# Patient Record
Sex: MALE | Race: BLACK OR AFRICAN AMERICAN | URBAN - METROPOLITAN AREA
[De-identification: names, ages, dates, MRNs, and addresses within clinical notes are randomized per-mention and may not be internally consistent; named-entity substitution may affect disease eponyms.]

---

## 2021-09-27 ENCOUNTER — DOCUMENTATION ONLY (OUTPATIENT)
Dept: TRANSPLANT | Facility: CLINIC | Age: 46
End: 2021-09-27

## 2021-09-27 ENCOUNTER — TELEPHONE (OUTPATIENT)
Dept: TRANSPLANT | Facility: CLINIC | Age: 46
End: 2021-09-27

## 2021-09-27 NOTE — TELEPHONE ENCOUNTER
"vdentity Information  SSN:[id number]  Donor has applied for lost wage reimbursement  Current Status: Surgery Scheduled:[not entered]  Surgery Confirmed?[not answered]  Donor Intake Start:21Donor Intake Complete:21  Expiration Date:21  Gender:MalePreferred Language:English  Full Name:Buddy Ross  Needed:[not answered]  Phone Number:[phone] Phone:[phone]  Contact Preference:[not answered]Best Contact Time:10am - 5pm  Emergency Contact:Kathryn Burgos Contact #:[phone]  Relationship to Contact:Contact is my spouse  :[birth year]Age:46  Country:[place]  Address:[address] [place] [address] AveCity:[place]  State:[place] Code:[id number]  Height:5'9\"Weight:185lbs  BMI:27.3  Employment Status:EmployedHas PTO for donation?No, not reimbursed  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 De Jesus  [place], [place]  Donating for Local Recipient   Recipient's Name:Ramiro Mills :47  Recipient's Status:Patient on dialysis.How DD knows Recip:Other  DD communicates w/ Recip:Several Times Per Week  Indicated possible interest in:  Motivation to donate:  Dialysis has been hard on the recipient.  Living Donor Pre-Screening  Is In [place]  Yes  Will Accept Blood Transfusions?  Yes  Has been Diagnosed with Kidney Disease?  No  Has had a Heart Attack?  No  Has Diabetes?  No  Has had Cancer?  No  Has had Kidney Stones?  No  Has Used Tobacco  Yes    - Currently uses Tobacco?  Yes    - Will Stop for Surgery?  Yes    - How Many Years:29    - Tobacco use (packs/cans) / Frequency:1 / Daily  Has HIV?  No  Is Currently Incarcerated?  No  Is Currently Residing in [place]  Yes  History Misc  Has Allergies?  No  Has had " Surgeries?  Yes  Surgery When  ACL and MCL 2006?  Lower Back 2010?  Takes Medication?  No  Medical History  History of High BP?  Never  Has History Of CABG (bypass surgery)?  No  History of Blood Clots?  Never  History of Coronary Disease?  Never  Has Stents Implanted?  No  Has History of Chest Pain with Exercise?  No  Has History of Chest Pain at Other Times?  No  Results of Climbing 2 Flights of Stairs?No Problem  Has had Stress Test within Last Year?  No  Has had Stroke?  No  Has had Leg Bypass?  No  History of Lung Disease?  Never  History of COPD?  Never  History of TB?  Never    - Is TB Active?[not answered]  History of Pneumonia?  Never  Has Respiratory Issues?  No  Has Gastro Issues?  No  History of Gallstones?  Never  History of Pancreatitis?  Never  History of Liver Disease?  Never  History of Hepatitis B?  Never    - Is Hep B Active?[not answered]  History of Hepatitis C?  Never  History of Bleeding Problem?  Never  History of UTIs?  No  History of Kidney Damage?  Never  History of Proteinuria?  Never  History of Hematuria?  Never  History of Neuro Disease?  Never  History of Seizure?  Never  History of Lupus?  Never  History of Paralysis?  Never  History of Arthritis?  Never  History of Neuropathy?  Never  History of Depression?  Never  History of Anxiety?  Never  History of Documented Psychiatric Illness?  Never  Has had Transfusions?  No  History of Obesity?  No  History of Fabry's Disease?  No  History of Sickle Cell Disease?  No  History of Sickle Cell Trait?  No  History of Sarcoidosis?  No  History of Auto-Immune Disease  No  Has had Physical Exam?  Yes    - how many years ago:2  Has had PSA Test?  No  Has had Colonoscopy?  No  Medical History Comments?[no comments]  Living Donor Family Medical History  Anyone with kidney disease?  Yes    - which family members:Maternal grandfather (Kidney Stones)  Anyone with liver disease?  No  Anyone with heart disease?  Yes    - which family members:Maternal  Grandmother  Anyone with coronary artery disease?  Unknown  Anyone with high blood pressure?  Unknown  Anyone with blood disorder?  Unknown  Anyone with cancer?  Yes    - which family members:Father, Paternal Grandfather  Anyone with kidney cancer?  No  Anyone with diabetes?  No  Is mother alive?  Yes  Mother's age?66  Is father alive?  No  Father's age?56  Father's cause of death?Lung Cancer  How many siblings?5  How many adult children?1  How many children under 18?2  Social History  Has Used Alcohol?  No  Has Used Drugs?  No  Has had legal issues w/ law enforcement?  No  Traveled over 100 miles from home in last year?  No  Has had suicidal thoughts or attempts in the last five years?  No

## 2021-09-28 ENCOUNTER — TELEPHONE (OUTPATIENT)
Dept: TRANSPLANT | Facility: CLINIC | Age: 46
End: 2021-09-28

## 2021-09-28 NOTE — TELEPHONE ENCOUNTER
RENETTA initial call attempted today.  No answer.  I left a voice message asking him to call the transplant center if he remains interested in pursuing living donation.

## 2021-09-28 NOTE — TELEPHONE ENCOUNTER
Initial Independent Living Donor Advocate contact made with potential donor today.  I introduced myself and my role during the donation process, includin.  RENETTA ROLE   The federal government requires that all licensed transplant centers provide the living donor with an Independent Living Donor Advocate (RENETTA).  I do not meet recipients or attend meetings that discuss their care or decision to transplant them. My role is separate to avoid any conflict of interest.  My role is to ensure:  1) your rights are protected;  2) you get all the information you need from the transplant team to make a fully informed decision whether to donate;   3) that living donation is in your best interest.   4) that you have the right to decide NOT to go forward with living donation at any time during this process.  I am available to you throughout the workup, during surgery phase and follow-up at home.   2. WORKUP & PRIVACY     Your identity and workup are not shared with the recipient at any time.     There is a medical donor workup that consists of testing to determine if you are healthy enough to donate.  Workup tests include many blood draws, urine collection/ (kidney function testing), chest x-ray, EKG, CT scan. As you complete each step then you may move on to the next.  Workup can take as little or as long as you need and you can stop the process at any time.     Transplant is a treatment option, not a cure. A kidney from a living kidney donor can last 12-14 years.  Other treatment options are  donation and two types of dialysis.     This is major surgery and your estimated hospital stay is approximately 1-2 nights.  After surgery, there are driving and lifting restrictions - no driving for two weeks and no lifting over ten pounds for 6 - 8 weeks.  Donors are routinely off from work for 4 - 6 weeks after surgery, and potentially longer if they have a physical job.       If you anticipate lost wages due to donation,  donor wage reimbursement options may be available to you and will be reviewed with you during the evaluation process.      The recipient's insurance covers the medical expenses related to the donor evaluation and surgery.  However, it is important for you to carry your own health insurance to address any medical issues that are found and are NOT related to living donation.  3.  QUESTIONS  Have you received a packet from the transplant department?  yes  Questions?    Have you discussed with anyone your potential decision to donate?   Yes, with wife and mother  Is anyone pressuring or coercing you to donate?no  Have you discussed any financial arrangements with recipient around donating a kidney?no  Are you aware that you can confidentially opt out at any time, up to and including day of donation? yes  At this time, would you like to proceed with the medical evaluation to see if you can be a kidney donor?  yes    If yes, the donor coordinator will be reaching out to you with next steps.     You can reach me or someone else on the RENETTA team by calling [phone] Option 3.    RENETTA NOTES: I was able to connect with Mr. Marshall today.  He is a highly motivated donor.  Some concerns since many family members have had kidney stones  ( he has not).  He will talk more with his coordinator, Lanie, when she connects with him at 11 AM on 10/12/2021.    Duration of call 30 minutes

## 2021-10-12 ENCOUNTER — TELEPHONE (OUTPATIENT)
Dept: TRANSPLANT | Facility: CLINIC | Age: 46
End: 2021-10-12

## 2021-10-12 NOTE — TELEPHONE ENCOUNTER
Contacted Buddy Marshall to introduce myself and my role, review of medical/surgical/family history and next steps.     Buddy Marshall  is aware He can stop donor evaluation at any time.    Have you ever been positive for COVID 19? no    Have you received the COVID vaccination? yes If yes, when and what brand? Pfizer, completed 5/2021     Buddy Marshall is a 46 year old male that would like to learn more about being a donor for his step dad Antonio Vyas. Buddy is open to paired exchange.      Concerns from medical/surgical/family history: smokes- motivated to quit, planning to reach out to his PCP for resources/support, occasional marijuana use- reports he could discontinue use if needed    Reviewed any history of travel in endemic areas: [place] only  Strongyloides- [place], [place] and [place].  Trypanosoma cruzi ([place])- [place]  West Nile Virus- [place], [place], [place], [place] and [place].     Per our Phase 1 algorithm, does not meet criteria to do preliminary testing.    Reviewed evaluation testing: Covid PCR, Iohexol, Lab work, CXR, EKG, Provider visits and functions, CT Angiogram.     Reviewed operations of selection committee and angio review meetings and the need for multidisciplinary input.     Briefly went over options if approved.     Buddy would like to proceed to evaluation.     Confirmed that he reviewed Informed consent document and all questions answered.  Reviewed that they will receive Docusign to obtain electronic signature for the following: Informed consent, SRTR data, ALFRED for medical information, Auth for Electronic communication and will need their signed consent back before proceeding with evaluation.      Encouraged sign up for MyChart and confirmed [facility] sign up.    Verified recipient status.     Will send orders to scheduling team to set up for evaluation testing.

## 2021-10-13 DIAGNOSIS — Z00.5 TRANSPLANT DONOR EVALUATION: Primary | ICD-10-CM

## 2021-10-13 RX ORDER — METHYLPREDNISOLONE SODIUM SUCCINATE 125 MG/2ML
125 INJECTION, POWDER, LYOPHILIZED, FOR SOLUTION INTRAMUSCULAR; INTRAVENOUS
Status: CANCELLED
Start: 2021-10-28

## 2021-10-13 RX ORDER — NALOXONE HYDROCHLORIDE 0.4 MG/ML
0.2 INJECTION, SOLUTION INTRAMUSCULAR; INTRAVENOUS; SUBCUTANEOUS
Status: CANCELLED | OUTPATIENT
Start: 2021-10-28

## 2021-10-13 RX ORDER — MEPERIDINE HYDROCHLORIDE 25 MG/ML
25 INJECTION INTRAMUSCULAR; INTRAVENOUS; SUBCUTANEOUS EVERY 30 MIN PRN
Status: CANCELLED | OUTPATIENT
Start: 2021-10-28

## 2021-10-13 RX ORDER — ALBUTEROL SULFATE 90 UG/1
1-2 AEROSOL, METERED RESPIRATORY (INHALATION)
Status: CANCELLED
Start: 2021-10-28

## 2021-10-13 RX ORDER — DIPHENHYDRAMINE HYDROCHLORIDE 50 MG/ML
50 INJECTION INTRAMUSCULAR; INTRAVENOUS
Status: CANCELLED
Start: 2021-10-28

## 2021-10-13 RX ORDER — EPINEPHRINE 1 MG/ML
0.3 INJECTION, SOLUTION, CONCENTRATE INTRAVENOUS EVERY 5 MIN PRN
Status: CANCELLED | OUTPATIENT
Start: 2021-10-28

## 2021-10-13 RX ORDER — ALBUTEROL SULFATE 0.83 MG/ML
2.5 SOLUTION RESPIRATORY (INHALATION)
Status: CANCELLED | OUTPATIENT
Start: 2021-10-28

## 2021-10-18 ENCOUNTER — TELEPHONE (OUTPATIENT)
Dept: TRANSPLANT | Facility: CLINIC | Age: 46
End: 2021-10-18

## 2021-10-18 NOTE — TELEPHONE ENCOUNTER
Buddy is supposed to do labs and COVID test at [facility] lab on 10/25/21.This is for his eval. Right now it looks like everything is scheduled on 10/28.So eval labs and COVID test on Mon 10/25 in [facility] lab--thanks!!

## 2021-10-24 ENCOUNTER — HEALTH MAINTENANCE LETTER (OUTPATIENT)
Age: 46
End: 2021-10-24

## 2021-10-25 ENCOUNTER — LAB (OUTPATIENT)
Dept: LAB | Facility: CLINIC | Age: 46
End: 2021-10-25
Attending: SURGERY

## 2021-10-25 DIAGNOSIS — Z00.5 TRANSPLANT DONOR EVALUATION: ICD-10-CM

## 2021-10-25 LAB
ABO/RH(D): NORMAL
ABO/RH(D): NORMAL
ALBUMIN SERPL-MCNC: 3.7 G/DL (ref 3.4–5)
ALBUMIN UR-MCNC: NEGATIVE MG/DL
ALP SERPL-CCNC: 57 U/L (ref 40–150)
ALT SERPL W P-5'-P-CCNC: 32 U/L (ref 0–70)
ANION GAP SERPL CALCULATED.3IONS-SCNC: 3 MMOL/L (ref 3–14)
ANTIBODY SCREEN: NEGATIVE
APPEARANCE UR: CLEAR
APTT PPP: 30 SECONDS (ref 22–38)
AST SERPL W P-5'-P-CCNC: 18 U/L (ref 0–45)
BILIRUB SERPL-MCNC: 1 MG/DL (ref 0.2–1.3)
BILIRUB UR QL STRIP: NEGATIVE
BUN SERPL-MCNC: 10 MG/DL (ref 7–30)
CALCIUM SERPL-MCNC: 8.9 MG/DL (ref 8.5–10.1)
CHLORIDE BLD-SCNC: 105 MMOL/L (ref 94–109)
CHOLEST SERPL-MCNC: 224 MG/DL
CMV IGG SERPL IA-ACNC: >10 U/ML
CMV IGG SERPL IA-ACNC: ABNORMAL
CO2 SERPL-SCNC: 28 MMOL/L (ref 20–32)
COLOR UR AUTO: YELLOW
CREAT SERPL-MCNC: 0.95 MG/DL (ref 0.66–1.25)
CREAT UR-MCNC: 165 MG/DL
CREAT UR-MCNC: 165 MG/DL
EBV VCA IGG SER IA-ACNC: >750 U/ML
EBV VCA IGG SER IA-ACNC: POSITIVE
EBV VCA IGM SER IA-ACNC: <10 U/ML
EBV VCA IGM SER IA-ACNC: NORMAL
ERYTHROCYTE [DISTWIDTH] IN BLOOD BY AUTOMATED COUNT: 12.4 % (ref 10–15)
FASTING STATUS PATIENT QL REPORTED: YES
GFR SERPL CREATININE-BSD FRML MDRD: >90 ML/MIN/1.73M2
GLUCOSE BLD-MCNC: 93 MG/DL (ref 70–99)
GLUCOSE UR STRIP-MCNC: NEGATIVE MG/DL
HBA1C MFR BLD: 5 % (ref 0–5.6)
HBV CORE AB SERPL QL IA: NONREACTIVE
HCT VFR BLD AUTO: 45.9 % (ref 40–53)
HCV AB SERPL QL IA: NONREACTIVE
HDLC SERPL-MCNC: 50 MG/DL
HGB BLD-MCNC: 15.7 G/DL (ref 13.3–17.7)
HGB UR QL STRIP: NEGATIVE
INR PPP: 1.06 (ref 0.85–1.15)
KETONES UR STRIP-MCNC: NEGATIVE MG/DL
LDLC SERPL CALC-MCNC: 152 MG/DL
LEUKOCYTE ESTERASE UR QL STRIP: NEGATIVE
MCH RBC QN AUTO: 30.6 PG (ref 26.5–33)
MCHC RBC AUTO-ENTMCNC: 34.2 G/DL (ref 31.5–36.5)
MCV RBC AUTO: 90 FL (ref 78–100)
MICROALBUMIN UR-MCNC: 6 MG/L
MICROALBUMIN/CREAT UR: 3.64 MG/G CR (ref 0–17)
MUCOUS THREADS #/AREA URNS LPF: PRESENT /LPF
NITRATE UR QL: NEGATIVE
NONHDLC SERPL-MCNC: 174 MG/DL
PH UR STRIP: 5 [PH] (ref 5–7)
PHOSPHATE SERPL-MCNC: 2.8 MG/DL (ref 2.5–4.5)
PLATELET # BLD AUTO: 269 10E3/UL (ref 150–450)
POTASSIUM BLD-SCNC: 3.6 MMOL/L (ref 3.4–5.3)
PROT SERPL-MCNC: 7 G/DL (ref 6.8–8.8)
PROT UR-MCNC: 0.08 G/L
PROT/CREAT 24H UR: 0.05 G/G CR (ref 0–0.2)
RBC # BLD AUTO: 5.13 10E6/UL (ref 4.4–5.9)
RBC URINE: 1 /HPF
SARS-COV-2 RNA RESP QL NAA+PROBE: NEGATIVE
SODIUM SERPL-SCNC: 136 MMOL/L (ref 133–144)
SP GR UR STRIP: 1.01 (ref 1–1.03)
SPECIMEN EXPIRATION DATE: NORMAL
T PALLIDUM AB SER QL: NONREACTIVE
TRIGL SERPL-MCNC: 109 MG/DL
URATE SERPL-MCNC: 4.7 MG/DL (ref 3.5–7.2)
UROBILINOGEN UR STRIP-MCNC: NORMAL MG/DL
WBC # BLD AUTO: 8.4 10E3/UL (ref 4–11)
WBC URINE: 1 /HPF

## 2021-10-25 PROCEDURE — 86900 BLOOD TYPING SEROLOGIC ABO: CPT | Mod: 90 | Performed by: PATHOLOGY

## 2021-10-25 PROCEDURE — 80053 COMPREHEN METABOLIC PANEL: CPT | Performed by: PATHOLOGY

## 2021-10-25 PROCEDURE — 86481 TB AG RESPONSE T-CELL SUSP: CPT | Mod: 90 | Performed by: PATHOLOGY

## 2021-10-25 PROCEDURE — 99000 SPECIMEN HANDLING OFFICE-LAB: CPT | Performed by: PATHOLOGY

## 2021-10-25 PROCEDURE — 84100 ASSAY OF PHOSPHORUS: CPT | Performed by: PATHOLOGY

## 2021-10-25 PROCEDURE — 84156 ASSAY OF PROTEIN URINE: CPT | Performed by: PATHOLOGY

## 2021-10-25 PROCEDURE — 81001 URINALYSIS AUTO W/SCOPE: CPT | Performed by: PATHOLOGY

## 2021-10-25 PROCEDURE — U0005 INFEC AGEN DETEC AMPLI PROBE: HCPCS | Mod: 90 | Performed by: PATHOLOGY

## 2021-10-25 PROCEDURE — 86780 TREPONEMA PALLIDUM: CPT | Mod: 90 | Performed by: PATHOLOGY

## 2021-10-25 PROCEDURE — 82043 UR ALBUMIN QUANTITATIVE: CPT | Performed by: PATHOLOGY

## 2021-10-25 PROCEDURE — 85730 THROMBOPLASTIN TIME PARTIAL: CPT | Performed by: PATHOLOGY

## 2021-10-25 PROCEDURE — 86706 HEP B SURFACE ANTIBODY: CPT | Mod: 90 | Performed by: PATHOLOGY

## 2021-10-25 PROCEDURE — 86788 WEST NILE VIRUS AB IGM: CPT | Mod: 90 | Performed by: PATHOLOGY

## 2021-10-25 PROCEDURE — U0003 INFECTIOUS AGENT DETECTION BY NUCLEIC ACID (DNA OR RNA); SEVERE ACUTE RESPIRATORY SYNDROME CORONAVIRUS 2 (SARS-COV-2) (CORONAVIRUS DISEASE [COVID-19]), AMPLIFIED PROBE TECHNIQUE, MAKING USE OF HIGH THROUGHPUT TECHNOLOGIES AS DESCRIBED BY CMS-2020-01-R: HCPCS | Mod: 90 | Performed by: PATHOLOGY

## 2021-10-25 PROCEDURE — 86803 HEPATITIS C AB TEST: CPT | Mod: 90 | Performed by: PATHOLOGY

## 2021-10-25 PROCEDURE — 84550 ASSAY OF BLOOD/URIC ACID: CPT | Performed by: PATHOLOGY

## 2021-10-25 PROCEDURE — 86704 HEP B CORE ANTIBODY TOTAL: CPT | Mod: 90 | Performed by: PATHOLOGY

## 2021-10-25 PROCEDURE — 87340 HEPATITIS B SURFACE AG IA: CPT | Mod: 90 | Performed by: PATHOLOGY

## 2021-10-25 PROCEDURE — 36415 COLL VENOUS BLD VENIPUNCTURE: CPT | Performed by: PATHOLOGY

## 2021-10-25 PROCEDURE — 86665 EPSTEIN-BARR CAPSID VCA: CPT | Mod: 90 | Performed by: PATHOLOGY

## 2021-10-25 PROCEDURE — 86644 CMV ANTIBODY: CPT | Mod: 90 | Performed by: PATHOLOGY

## 2021-10-25 PROCEDURE — 85027 COMPLETE CBC AUTOMATED: CPT | Performed by: PATHOLOGY

## 2021-10-25 PROCEDURE — 86789 WEST NILE VIRUS ANTIBODY: CPT | Mod: 90 | Performed by: PATHOLOGY

## 2021-10-25 PROCEDURE — 83036 HEMOGLOBIN GLYCOSYLATED A1C: CPT | Performed by: PATHOLOGY

## 2021-10-25 PROCEDURE — 86901 BLOOD TYPING SEROLOGIC RH(D): CPT | Mod: 90 | Performed by: PATHOLOGY

## 2021-10-25 PROCEDURE — 80061 LIPID PANEL: CPT | Performed by: PATHOLOGY

## 2021-10-25 PROCEDURE — 86850 RBC ANTIBODY SCREEN: CPT | Mod: 90 | Performed by: PATHOLOGY

## 2021-10-25 PROCEDURE — 85610 PROTHROMBIN TIME: CPT | Performed by: PATHOLOGY

## 2021-10-26 LAB
GAMMA INTERFERON BACKGROUND BLD IA-ACNC: 0.04 IU/ML
HBV SURFACE AB SERPL IA-ACNC: 0.56 M[IU]/ML
HBV SURFACE AG SERPL QL IA: NONREACTIVE
HIV 1+2 AB+HIV1 P24 AG SERPL QL IA: NONREACTIVE
M TB IFN-G BLD-IMP: NEGATIVE
M TB IFN-G CD4+ BCKGRND COR BLD-ACNC: 9.96 IU/ML
MITOGEN IGNF BCKGRD COR BLD-ACNC: 0.03 IU/ML
MITOGEN IGNF BCKGRD COR BLD-ACNC: 0.07 IU/ML
QUANTIFERON MITOGEN: 10 IU/ML
QUANTIFERON NIL TUBE: 0.04 IU/ML
QUANTIFERON TB1 TUBE: 0.07 IU/ML
QUANTIFERON TB2 TUBE: 0.11
WNV IGG SER IA-ACNC: 0.08 IV
WNV IGM SER IA-ACNC: 0.01 IV

## 2021-10-28 ENCOUNTER — APPOINTMENT (OUTPATIENT)
Dept: TRANSPLANT | Facility: CLINIC | Age: 46
End: 2021-10-28
Attending: TRANSPLANT SURGERY

## 2021-10-28 ENCOUNTER — LAB (OUTPATIENT)
Dept: LAB | Facility: CLINIC | Age: 46
End: 2021-10-28
Attending: INTERNAL MEDICINE

## 2021-10-28 ENCOUNTER — OFFICE VISIT (OUTPATIENT)
Dept: TRANSPLANT | Facility: CLINIC | Age: 46
End: 2021-10-28
Attending: TRANSPLANT SURGERY

## 2021-10-28 ENCOUNTER — OFFICE VISIT (OUTPATIENT)
Dept: NEPHROLOGY | Facility: CLINIC | Age: 46
End: 2021-10-28
Attending: TRANSPLANT SURGERY

## 2021-10-28 ENCOUNTER — OFFICE VISIT (OUTPATIENT)
Dept: INFUSION THERAPY | Facility: CLINIC | Age: 46
End: 2021-10-28
Attending: INTERNAL MEDICINE

## 2021-10-28 ENCOUNTER — ANCILLARY PROCEDURE (OUTPATIENT)
Dept: CT IMAGING | Facility: CLINIC | Age: 46
End: 2021-10-28
Attending: INTERNAL MEDICINE

## 2021-10-28 ENCOUNTER — ANCILLARY PROCEDURE (OUTPATIENT)
Dept: GENERAL RADIOLOGY | Facility: CLINIC | Age: 46
End: 2021-10-28
Attending: INTERNAL MEDICINE

## 2021-10-28 ENCOUNTER — ALLIED HEALTH/NURSE VISIT (OUTPATIENT)
Dept: TRANSPLANT | Facility: CLINIC | Age: 46
End: 2021-10-28
Attending: TRANSPLANT SURGERY

## 2021-10-28 VITALS
HEART RATE: 45 BPM | SYSTOLIC BLOOD PRESSURE: 128 MMHG | HEIGHT: 69 IN | BODY MASS INDEX: 26.81 KG/M2 | DIASTOLIC BLOOD PRESSURE: 83 MMHG | WEIGHT: 181 LBS | OXYGEN SATURATION: 98 %

## 2021-10-28 DIAGNOSIS — Z00.5 TRANSPLANT DONOR EVALUATION: ICD-10-CM

## 2021-10-28 DIAGNOSIS — Z00.5 TRANSPLANT DONOR EVALUATION: Primary | ICD-10-CM

## 2021-10-28 PROCEDURE — 36415 COLL VENOUS BLD VENIPUNCTURE: CPT

## 2021-10-28 PROCEDURE — 36415 COLL VENOUS BLD VENIPUNCTURE: CPT | Performed by: TRANSPLANT SURGERY

## 2021-10-28 PROCEDURE — 81378 HLA I & II TYPING HR: CPT

## 2021-10-28 PROCEDURE — 74175 CTA ABDOMEN W/CONTRAST: CPT | Performed by: RADIOLOGY

## 2021-10-28 PROCEDURE — 82542 COL CHROMOTOGRAPHY QUAL/QUAN: CPT | Performed by: TRANSPLANT SURGERY

## 2021-10-28 PROCEDURE — 99203 OFFICE O/P NEW LOW 30 MIN: CPT | Performed by: TRANSPLANT SURGERY

## 2021-10-28 PROCEDURE — 99245 OFF/OP CONSLTJ NEW/EST HI 55: CPT

## 2021-10-28 PROCEDURE — 71046 X-RAY EXAM CHEST 2 VIEWS: CPT | Mod: GC | Performed by: RADIOLOGY

## 2021-10-28 PROCEDURE — 250N000011 HC RX IP 250 OP 636: Performed by: INTERNAL MEDICINE

## 2021-10-28 RX ORDER — EPINEPHRINE 1 MG/ML
0.3 INJECTION, SOLUTION INTRAMUSCULAR; SUBCUTANEOUS EVERY 5 MIN PRN
Status: CANCELLED | OUTPATIENT
Start: 2021-10-28

## 2021-10-28 RX ORDER — NALOXONE HYDROCHLORIDE 0.4 MG/ML
0.2 INJECTION, SOLUTION INTRAMUSCULAR; INTRAVENOUS; SUBCUTANEOUS
Status: CANCELLED | OUTPATIENT
Start: 2021-10-28

## 2021-10-28 RX ORDER — ALBUTEROL SULFATE 90 UG/1
1-2 AEROSOL, METERED RESPIRATORY (INHALATION)
Status: CANCELLED
Start: 2021-10-28

## 2021-10-28 RX ORDER — MEPERIDINE HYDROCHLORIDE 25 MG/ML
25 INJECTION INTRAMUSCULAR; INTRAVENOUS; SUBCUTANEOUS EVERY 30 MIN PRN
Status: CANCELLED | OUTPATIENT
Start: 2021-10-28

## 2021-10-28 RX ORDER — METHYLPREDNISOLONE SODIUM SUCCINATE 125 MG/2ML
125 INJECTION, POWDER, LYOPHILIZED, FOR SOLUTION INTRAMUSCULAR; INTRAVENOUS
Status: CANCELLED
Start: 2021-10-28

## 2021-10-28 RX ORDER — ALBUTEROL SULFATE 0.83 MG/ML
2.5 SOLUTION RESPIRATORY (INHALATION)
Status: CANCELLED | OUTPATIENT
Start: 2021-10-28

## 2021-10-28 RX ORDER — IOPAMIDOL 755 MG/ML
100 INJECTION, SOLUTION INTRAVASCULAR ONCE
Status: COMPLETED | OUTPATIENT
Start: 2021-10-28 | End: 2021-10-28

## 2021-10-28 RX ORDER — DIPHENHYDRAMINE HYDROCHLORIDE 50 MG/ML
50 INJECTION INTRAMUSCULAR; INTRAVENOUS
Status: CANCELLED
Start: 2021-10-28

## 2021-10-28 RX ADMIN — IOPAMIDOL 100 ML: 755 INJECTION, SOLUTION INTRAVASCULAR at 12:36

## 2021-10-28 RX ADMIN — IOHEXOL 5 ML: 300 INJECTION, SOLUTION INTRAVENOUS at 07:10

## 2021-10-28 ASSESSMENT — MIFFLIN-ST. JEOR: SCORE: 1686

## 2021-10-28 NOTE — PROGRESS NOTES
Donor Iohexol test    Buddy Marshall presents today to [facility] for a Donor Iohexol test.      Progress note:  ID verified by name and .     The following information was verified with the patient:  Female Patients is there any possibility of being pregnant n/a  Is there a history of allergy (skin rash, swelling, ect) to:   A.  Iodine (except skin reactions to betadine): No   B. Intravenous radio-contrast agents: No   C. Seafood No     present during visit today: Not Applicable.    R.N. provided patient with educational handout regarding timed test. Yes    24 g piv placed in right ac and Iohexol administered over 2 minutes.  Positive blood return verified before and after injection. piv removed.  20 gauge PIV placed in left  for blood draws and CT this afternoon.    Medication administered:  Iohexol (Omnipaque 300mg iodine/ml concentration) 5 mls.    Start time: 710  Stop time: 712    Drug Waste Record    Drug Name: iohexol  Dose: 5ml  Route administered: IV  NDC #: 0407-1413-10  Amount of waste(mL):5ml  Reason for waste: Single use vial    Administrations This Visit     iohexol (OMNIPAQUE 300) injection 5 mL     Admin Date  10/28/2021 Action  Given Dose  5 mL Route  Intravenous Administered By  Marilynn Guzman RN                    Evaluation nurse in transplant to draw labs at 2 and 4 hours post iohexol administration.  Patient given a slip with the times to get labs drawn and verbalized understanding of the plan.    Patient tolerated the procedure:  Yes    After the infusion patient was discharged to the next appointment.    Marilynn Guzman RN

## 2021-10-28 NOTE — LETTER
REIMBURSEMENT INFORMATION FOR LIVING ORGAN DONORS    LIVING ORGAN DONOR: This form MUST accompany & remain attached to Orders &  given to Provider and/or Healthcare Facility Business Office    PROVIDER/FACILITY INSTRUCTIONS: By accepting to perform these services for living organ  donation, the provider/facility agrees to exclusively bill the [facility] instead of billing  the patient or any insurance provider and agrees to accept the reimbursement, as described below, as  payment in full for services rendered.    PROVIDER BILLING INSTRUCTIONS:  1. [facility] agrees to pay for all authorized testing ordered by our transplant  program that is related to living organ donation. The attached orders/tests are part of the donor  Evaluation.    2. Do not bill the donor or donor's insurance. Send an itemized invoice, claim or statement to:    [facility]  Transplant Finance/Donor Billing  [address] [place]  [place] [address]    3. Billing statements must include the patient first and last name, date of birth, the CPT procedure code  and date of service. Please bill service on the ORIGINAL UBO4 or 1500 with appropriate CPT/HCPCS  codes along with W-9 and send to the above address to insure timely reimbursement.    4. Claims should be submitted no later than six months from the date when services are rendered.  Claims denied for late submission should not be billed to the donor or their private insurance carrier.    5. [facility] will reimburse all charges at 100% of the Medicare Fee Schedule as  defined in the Code of Federal Regulations (CFR) 42, Chapter IV. This is to be considered payment  in full. [facility], the patient, and/or the patient's insurance are NOT to  be billed any balance, co-payment, or deductible, per Medicare regulations. **ATTN: Facility  providing services for attached/enclosed Living Donor Orders; If facility does  NOT AGREE to  the reimbursement rate stated above, PLEASE DENY SERVICES & refer Donor/patient back to  their [facility] Coordinator Transplant Center.    6. Patients are NOT to make any payments at the time of service.    Please forward this information to your billing department so that a donor account can be set up with  these instructions.    Should you have any questions, please contact the Donor Billing office at [phone],  Monday - Friday, 8:00 a.m. to 4:00 p.m.   Thank you for your assistance.

## 2021-10-28 NOTE — PROGRESS NOTES
Transplant Surgery Consult Note    Medical record number: [id number]  YOB: 1975,   Consult requested by the patient for evaluation of kidney donation candidacy.    Assessment and Recommendations: Mr. Marshall appears to be a good candidate for kidney donation at this point in the evaluation. The following issues will need to be addressed prior to formal review:  Complete medical and imaging evaluation  Needs eval for kidney stones, strong family history.     The majority of our visit today was spent in counseling regarding the medical and surgical risks of kidney donation, the typical jane-and post-operative experience and recovery/return to work pattern.  We also talked about post-op visits and longer term health care maintenance, as well as the implications of having one remaining kidney. This discussion included, but was not limited to rates of complications such as bleeding, infection, need for transfusion, reoperation, other organ injury, future bowel obstruction, incisional hernia, port site pain, varicocele, venous thrombosis, pulmonary embolism, renal failure, and death (3 per 10,000). The patient understands that if end stage renal failure happens that dialysis or transplant would be required.  At the conclusion of the visit, all questions had been answered and Mr. Marshall's candidacy for donation will be reviewed at our Multidisciplinary Donor Selection Committee. He will stay in contact with the nurse coordinator with any concerns.      Total time: 45 minutes        Toya Casey MD  ---------------------------------------------------------------------------------------------------    HPI: Buddy Marshall is a 46 year old year old male who presents for a kidney donor evaluation.  Patient would like to donate to his step fathert.      Personal history of:   No    Yes  Cancer:    [x]      []             Comment:     Diabetes   [x]      []  Comment:    Karen   [x]      []  Comment:        Hepatitis   [x]      []  Comment:    Tuberculosis   [x]      []  Comment:   Back or neck pain:  []      [x]  Comment:    No currently but had back surgery   Kidney stones   [x]      []  Comment:                  Kidney infections  [x]      []  Comment:           Urinary retention  [x]      []            Comment:   Regular NSAID use:  [x]      []            Comment:      Constipation:   [x]      []            Comment:      Synagogue  [x]      []            Comment:      Other:    [x]      []            Comment:         Past Medical History:   Diagnosis Date     Back pain     resolved after surgery in 2010     NO ACTIVE PROBLEMS      TOBACCO ABUSE-CONTINUOUS      Past Surgical History:   Procedure Laterality Date     ADENOIDECTOMY       DISKECTOMY, LUMBAR, ADDTNL SP  2010     KNEE SURGERY  2006     PE TUBES  as a child     VASECTOMY  2021     Family History   Problem Relation Age of Onset     Gynecology Mother      Cancer Mother      Alcohol/Drug Father      Asthma Maternal Grandmother      Hypertension Maternal Grandmother      Cardiovascular Maternal Grandmother         at age 56     Circulatory Maternal Grandmother         spleen removed     Heart Disease Maternal Grandmother         angioplasty     Nephrolithiasis Maternal Grandfather      Alzheimer Disease Paternal Grandmother      Heart Disease Paternal Grandmother      Social History     Socioeconomic History     Marital status:      Spouse name: Not on file     Number of children: Not on file     Years of education: Not on file     Highest education level: Not on file   Occupational History     Not on file   Tobacco Use     Smoking status: Current Every Day Smoker     Packs/day: 1.00     Years: 15.00     Pack years: 15.00     Types: Cigarettes     Smokeless tobacco: Never Used     Tobacco comment: Smoking for 14 yrs   Substance and Sexual Activity     Alcohol use: No     Drug use: Yes     Comment: Reffer occationly (twice a month)     Sexual  activity: Yes     Partners: Female     Comment: No birth control used   Other Topics Concern      Service No     Blood Transfusions No     Caffeine Concern No     Occupational Exposure No     Hobby Hazards Yes     Comment: playing flag football (reason for the surgery)     Sleep Concern No     Stress Concern No     Weight Concern No     Special Diet No     Back Care No     Exercise Yes     Comment: 3-4 times a week     Bike Helmet No     Seat Belt Yes     Self-Exams No   Social History Narrative     Not on file     Social Determinants of Health     Financial Resource Strain:      Difficulty of Paying Living Expenses:    Food Insecurity:      Worried About Running Out of Food in the Last Year:      Ran Out of Food in the Last Year:    Transportation Needs:      Lack of Transportation (Medical):      Lack of Transportation (Non-Medical):    Physical Activity:      Days of Exercise per Week:      Minutes of Exercise per Session:    Stress:      Feeling of Stress :    Social Connections:      Frequency of Communication with Friends and Family:      Frequency of Social Gatherings with Friends and Family:      Attends [employer] Services:      Active Member of Clubs or Organizations:      Attends Club or Organization Meetings:      Marital Status:    Intimate Partner Violence:      Fear of Current or Ex-Partner:      Emotionally Abused:      Physically Abused:      Sexually Abused:        ROS:   CONSTITUTIONAL:  No fevers or chills  EYES: negative for icterus  ENT:  negative for hearing loss, tinnitus and sore throat  RESPIRATORY:  negative for cough, sputum, dyspnea  CARDIOVASCULAR:  negative for chest pain  GASTROINTESTINAL:  negative for nausea, vomiting, diarrhea or constipation  GENITOURINARY:  negative for incontinence, dysuria, bladder emptying problems  HEME:  No easy bruising  INTEGUMENT:  negative for rash and pruritus  NEURO:  Negative for headache, seizure disorder    Allergies:   No Known  Allergies    Medications:  Prescription Medications as of 10/28/2021       Rx Number Disp Refills Start End Last Dispensed Date Next Fill Date Owning Pharmacy    [facility] MotoTIX STARTING MONTH MONCHO 0.5 MG X 11 & 1 MG X 14 OR MISC  1 pack 0 12/22/2006        Sig: take as directed    Route: Oral    NO ACTIVE MEDICATIONS            Sig: .    Class: Historical      Clinic-Administered Medications as of 10/28/2021       Dose Frequency Start End    iohexol (OMNIPAQUE 300) injection 5 mL (Completed) 5 mL ONCE 10/28/2021 10/28/2021    Route: Intravenous          Exam:   Pulse:  [45] 45  BP: (125-131)/(82-83) 128/83  SpO2:  [98 %] 98 %  Body mass index is 26.5 kg/m .  Pulse:  [45] 45  BP: (125-131)/(82-83) 128/83  SpO2:  [98 %] 98 %  Appearance: in no apparent distress.   Skin: normal  Head and Neck: Normal, no rashes or jaundice  Respiratory: normal respiratory excursions, no audible wheeze  Cardiovascular: RRR  Abdomen: flat, No distention   Extremeties: Edema, none  Neuro: without deficit       Diagnostics:   Recent Results (from the past 336 hour(s))   Comprehensive metabolic panel    Collection Time: 10/25/21  7:40 AM   Result Value Ref Range    Sodium 136 133 - 144 mmol/L    Potassium 3.6 3.4 - 5.3 mmol/L    Chloride 105 94 - 109 mmol/L    Carbon Dioxide (CO2) 28 20 - 32 mmol/L    Anion Gap 3 3 - 14 mmol/L    Urea Nitrogen 10 7 - 30 mg/dL    Creatinine 0.95 0.66 - 1.25 mg/dL    Calcium 8.9 8.5 - 10.1 mg/dL    Glucose 93 70 - 99 mg/dL    Alkaline Phosphatase 57 40 - 150 U/L    AST 18 0 - 45 U/L    ALT 32 0 - 70 U/L    Protein Total 7.0 6.8 - 8.8 g/dL    Albumin 3.7 3.4 - 5.0 g/dL    Bilirubin Total 1.0 0.2 - 1.3 mg/dL    GFR Estimate >90 >60 mL/min/1.73m2   Lipid Profile    Collection Time: 10/25/21  7:40 AM   Result Value Ref Range    Cholesterol 224 (H) <200 mg/dL    Triglycerides 109 <150 mg/dL    Direct Measure HDL 50 >=40 mg/dL    LDL Cholesterol Calculated 152 (H) <=100 mg/dL    Non HDL Cholesterol 174 (H) <130 mg/dL     Patient Fasting > 8hrs? Yes    Uric acid    Collection Time: 10/25/21  7:40 AM   Result Value Ref Range    Uric Acid 4.7 3.5 - 7.2 mg/dL   Phosphorus    Collection Time: 10/25/21  7:40 AM   Result Value Ref Range    Phosphorus 2.8 2.5 - 4.5 mg/dL   Hemoglobin A1c    Collection Time: 10/25/21  7:40 AM   Result Value Ref Range    Hemoglobin A1C 5.0 0.0 - 5.6 %   INR    Collection Time: 10/25/21  7:40 AM   Result Value Ref Range    INR 1.06 0.85 - 1.15   Partial thromboplastin time    Collection Time: 10/25/21  7:40 AM   Result Value Ref Range    aPTT 30 22 - 38 Seconds   CBC with platelets    Collection Time: 10/25/21  7:40 AM   Result Value Ref Range    WBC Count 8.4 4.0 - 11.0 10e3/uL    RBC Count 5.13 4.40 - 5.90 10e6/uL    Hemoglobin 15.7 13.3 - 17.7 g/dL    Hematocrit 45.9 40.0 - 53.0 %    MCV 90 78 - 100 fL    MCH 30.6 26.5 - 33.0 pg    MCHC 34.2 31.5 - 36.5 g/dL    RDW 12.4 10.0 - 15.0 %    Platelet Count 269 150 - 450 10e3/uL   Hepatitis B core antibody    Collection Time: 10/25/21  7:40 AM   Result Value Ref Range    Hepatitis B Core Antibody Total Nonreactive Nonreactive   Hepatitis B Surface Antibody    Collection Time: 10/25/21  7:40 AM   Result Value Ref Range    Hepatitis B Surface Antibody 0.56 <8.00 m[IU]/mL   Hepatitis B surface antigen    Collection Time: 10/25/21  7:40 AM   Result Value Ref Range    Hepatitis B Surface Antigen Nonreactive Nonreactive   Hepatitis C antibody    Collection Time: 10/25/21  7:40 AM   Result Value Ref Range    Hepatitis C Antibody Nonreactive Nonreactive   HIV Antigen Antibody Combo Pretransplant    Collection Time: 10/25/21  7:40 AM   Result Value Ref Range    HIV Antigen Antibody Combo Pretransplant Nonreactive Nonreactive   Treponema Abs w Reflex to RPR and Titer    Collection Time: 10/25/21  7:40 AM   Result Value Ref Range    Treponema Antibody Total Nonreactive Nonreactive   West Nile Virus Antibody IgG IgM    Collection Time: 10/25/21  7:40 AM   Result Value Ref  Range    West Nile IgG Serum 0.08 <=1.29 IV    West Nile IgM Serum 0.01 <=0.89 IV   CMV Antibody IgG    Collection Time: 10/25/21  7:40 AM   Result Value Ref Range    CMV Sofy IgG Instrument Value >10.00 (H) <0.60 U/mL    CMV Antibody IgG Positive, suggests recent or past exposure. (A) No detectable antibody.    EBV Capsid Antibody IgG    Collection Time: 10/25/21  7:40 AM   Result Value Ref Range    EBV Capsid Sofy IgG Instrument Value >750.0 (H) <18.0 U/mL    EBV Capsid Antibody IgG Positive (A) No detectable antibody.   EBV Capsid Antibody IgM    Collection Time: 10/25/21  7:40 AM   Result Value Ref Range    EBV Capsid Sofy IgM Instrument Value <10.0 <36.0 U/mL    EBV Capsid Antibody IgM No detectable antibody. No detectable antibody.   Asymptomatic COVID-19 Virus (Coronavirus) by PCR Nose    Collection Time: 10/25/21  7:40 AM    Specimen: Nose; Swab   Result Value Ref Range    SARS CoV2 PCR Negative Negative   Adult Type and Screen    Collection Time: 10/25/21  7:40 AM   Result Value Ref Range    ABO/RH(D) O POS     Antibody Screen Negative    Quantiferon TB Gold Plus Grey Tube    Collection Time: 10/25/21  7:40 AM    Specimen: Peripheral Blood   Result Value Ref Range    Quantiferon Nil Tube 0.04 IU/mL   Quantiferon TB Gold Plus Green Tube    Collection Time: 10/25/21  7:40 AM    Specimen: Peripheral Blood   Result Value Ref Range    Quantiferon TB1 Tube 0.07 IU/mL   Quantiferon TB Gold Plus Yellow Tube    Collection Time: 10/25/21  7:40 AM    Specimen: Peripheral Blood   Result Value Ref Range    Quantiferon TB2 Tube 0.11    Quantiferon TB Gold Plus Purple Tube    Collection Time: 10/25/21  7:40 AM    Specimen: Peripheral Blood   Result Value Ref Range    Quantiferon Mitogen 10.00 IU/mL   Quantiferon TB Gold Plus    Collection Time: 10/25/21  7:40 AM    Specimen: Peripheral Blood   Result Value Ref Range    Quantiferon-TB Gold Plus Negative Negative    TB1 Ag minus Nil Value 0.03 IU/mL    TB2 Ag minus Nil Value  0.07 IU/mL    Mitogen minus Nil Result 9.96 IU/mL    Nil Result 0.04 IU/mL   ABO and Rh 2nd type and screen required    Collection Time: 10/25/21  7:41 AM   Result Value Ref Range    ABO/RH(D) O POS     SPECIMEN EXPIRATION DATE     Routine UA with microscopic    Collection Time: 10/25/21  8:16 AM   Result Value Ref Range    Color Urine Yellow Colorless, Straw, Light Yellow, Yellow    Appearance Urine Clear Clear    Glucose Urine Negative Negative mg/dL    Bilirubin Urine Negative Negative    Ketones Urine Negative Negative mg/dL    Specific Gravity Urine 1.013 1.003 - 1.035    Blood Urine Negative Negative    pH Urine 5.0 5.0 - 7.0    Protein Albumin Urine Negative Negative mg/dL    Urobilinogen Urine Normal Normal, 2.0 mg/dL    Nitrite Urine Negative Negative    Leukocyte Esterase Urine Negative Negative    Mucus Urine Present (A) None Seen /LPF    RBC Urine 1 <=2 /HPF    WBC Urine 1 <=5 /HPF   Albumin Random Urine Quantitative with Creat Ratio    Collection Time: 10/25/21  8:17 AM   Result Value Ref Range    Creatinine Urine mg/dL 165 mg/dL    Albumin Urine mg/L 6 mg/L    Albumin Urine mg/g Cr 3.64 0.00 - 17.00 mg/g Cr   Protein  random urine with Creat Ratio    Collection Time: 10/25/21  8:17 AM   Result Value Ref Range    Total Protein Random Urine g/L 0.08 g/L    Total Protein Urine g/gr Creatinine 0.05 0.00 - 0.20 g/g Cr    Creatinine Urine mg/dL 165 mg/dL

## 2021-10-28 NOTE — PROGRESS NOTES
[facility] Solid Organ Transplant  Outpatient MNT: Kidney Donor Evaluation    Current BMI: 26.5 (HT 69 in,  lbs/82 kg)  BMI is within recommendation of <30 for kidney donation    8 Year Estimated Risk of T2DM  </= 3%     Time Spent: 15 minutes  Visit Type: Initial   Referring Physician: Carmela  Pt accompanied by: self    Nutrition Assessment  Reports poor diet at baseline, but that he doesn't eat much in general.     Vitamins, Supplements, Pertinent Meds: none   Herbal Medicines/Supplements: none     Weight hx: lost some weight, ~15 lbs over the past year     Food Security: any concerns about having enough money to buy food or access to grocery stores? Yes; just applied for snap    Diet Recall  Breakfast None d/t routine    Lunch None d/t routine    Dinner Pasta, pizza, burger (home vs out)   Snacks Not regular   Beverages Coffee w/ sugar, minimal water, 1-2 soda/day    Alcohol <1x/month   Dining out 1-2x/week     Physical Activity  Sporadic- basketball     Labs  Recent Labs   Lab Test 10/25/21  0740   CHOL 224*   HDL 50   *   TRIG 109       FBG = 93  A1c = 5  BP = wnl x 3     Prediction of Incident Diabetes Mellitus in Middle-aged Adults: The [place] Offspring Study  Sharath Baca MD; James B. Meigs, MD, MPH; Shira Conner, PhD; Deirdre Coyne MD, MPH; Justin Dong MD; Sánchez Chino Sr,   PhD  Pt's estimated risk for T2DM (per Table 6 above)  Pt received points for the following criteria: BMI>25  Total points: 2  8-Year estimated risk of T2DM: </= 3%    Nutrition Diagnosis  No nutrition diagnosis identified at this time.    Nutrition Intervention  Nutrition education provided:  Reviewed overall healthy diet guidelines for pre and post kidney donation. Discussed maintenance of a healthy weight and Na+ intake <3000 mg/day (<2000 mg/day if HTN).  Encouraged pt to include some fruits or veggies (fiber) in diet to help reduce cholesterol.     Avoid the following post op d/t  unknown effects on the organs:  - Herbal, Chinese, holistic, chiropractic, natural, alternative medicines and supplements  - Detoxes and cleanses  - Weight loss pills  - Protein powders or other products with extracts or herbs (ie green tea extract)    Patient Understanding: Pt verbalized understanding of education provided.  Expected Engagement: Good  Follow-Up Plans: PRN     Nutrition Goals  No nutrition goals identified at this time     Esther Santos, RD, LD, CCTD

## 2021-10-28 NOTE — LETTER
10/28/2021       RE: Buddy Marshall  [address]  [facility] [address]     Dear Colleague,    Thank you for referring your patient, Buddy Marshall, to the [facility] [place] at [facility]. Please see a copy of my visit note below.    TRANSPLANT NEPHROLOGY DONOR EVALUATION    Assessment and Plan:  # Prospective Kidney Transplant Donor: Patient with a couple of issues that need to be addressed prior to donation. Patient's blood pressure is acceptable at this visit, kidney function appears to be acceptable with Iohexol pending, and urinalysis is bland.    # Need for lung cancer screenin-year-pack history   # APOL gene testing     Discussed the risks of donating a kidney, including the surgical risk and the possible risks of living with one kidney.    Education about expected post-donation kidney function and how chronic kidney disease (CKD) and end stage kidney disease (ESKD) might potentially impact the donor in the future, include, but not limited to:       - On average, donors will have 25-35% permanent loss of kidney function at donation.       - Baseline risk of ESKD may slightly exceed that of members of the general population with the same demographic profile.       - Donor risks must be interpreted in light of known epidemiology of both CKD or ESKD, such as that CKD generally develops in midlife (40-50 years old) and ESKD generally develops after age 60.       - Medical evaluation of young potential donors cannot predict lifetime risk of CKD or ESKD.       - Donors may be at higher risk for CKD if they sustain damage to the remaining kidney.       - Development of CKD and progression of ESKD may be more rapid with only 1 kidney.       - Some type of kidney replacement therapy, either kidney transplant or dialysis, is required when reaching ESKD.    Potential medical or surgical risks include, but not limited to:       -  Death.       - Scars, pain, fatigue, and other consequences typical of any surgical procedure.       - Decreased kidney function.       - Abdominal or bowel symptoms, such as bloating and nausea, and developing bowel obstruction.       - Kidney failure (ESKD) and the need for a kidney transplant or dialysis for the donor.       - Impact of obesity, hypertension, or other donor-specific medical conditions on morbidity and mortality of the potential donor.    Patients overall evaluation will be discussed with the transplant team and a final recommendation on the patients' suitability to be a kidney transplant donor will be made at that time.    Consult:  Buddy Marshall was seen in consultation at the request of Dr. CLARKE for evaluation as a potential kidney transplant donor.    Reason for Visit:  Buddy Marshall is a 46 year old male who presents for a kidney donor evaluation.  Patient would like to donate to Step Father .    Present Condition and Donor-Related Medical History:      Buddy Everett is a 46-year-old gentleman who is interested in donating kidney to his stepfather.  Buddy is generally healthy he does not have known chronic conditions.  He smokes regularly about a pack a day for the last 30 years.  His biological father passed away from lung cancer at age 55.  His usual day is active.  He works as a realtor.  He lives with his wife and 5 children in the household.  His family is supportive of his decision.  He had the opportunity today to ask all of his questions and all of which were answered satisfactorily.         Kidney Disease Hx:       h/o Kidney Problems: No  Family h/o Genetic Kidney Disease: No       h/o HTN: No    Usual BP: does not know       h/o Protein in Urine: No  h/o Blood in Urine: No       h/o Kidney Stones: No  h/o Kidney Injury: No       h/o Recurrent UTI: No  h/o Genitourinary Problems: No       h/o Chronic NSAID Use: No         Other Medical Hx:       h/o DM: No             h/o  Gastrointestinal, Pancreas or Liver Problems: No       h/o Lung or Heart Problems: No       h/o Hematologic Problems: No  h/o Bleeding or Clotting Problems: No       h/o Cancer: No       h/o Infection Problems: No              Skin Cancer Risk:       h/o more than 50 moles: No       h/o extensive sun exposure: No       h/o melanoma: No       Family h/o melanoma: porbably not          Mental Health Assessment:       h/o Depression: No       h/o Psychiatric Illness: No       h/o Suicidal Attempt(s): No    COVID Status:  Vaccination Up To Date: Yes  H/o COVID Infection: No     Review Of Systems:   A comprehensive review of systems was obtained and negative, except as noted in the HPI or PMH.    Past Medical History:   History was taken from the patient as noted below.  Past Medical History:   Diagnosis Date     Back pain     resolved after surgery in 2010     NO ACTIVE PROBLEMS      TOBACCO ABUSE-CONTINUOUS        Past Social History:   Past Surgical History:   Procedure Laterality Date     ADENOIDECTOMY       DISKECTOMY, LUMBAR, ADDTNL SP  2010     KNEE SURGERY  2006     PE TUBES  as a child     VASECTOMY  2021     Personal or family history of anesthesia problems: No    Family History:   Family History   Problem Relation Age of Onset     Gynecology Mother      Cancer Mother      Alcohol/Drug Father      Lung Cancer Father      Hypertension Maternal Grandmother      Cardiovascular Maternal Grandmother         at age 56     Circulatory Maternal Grandmother         spleen removed     Heart Disease Maternal Grandmother         angioplasty     Parkinsonism Maternal Grandmother      Nephrolithiasis Maternal Grandfather      Alzheimer Disease Paternal Grandmother      Heart Disease Paternal Grandmother      Emphysema Paternal Grandfather           Specific Family History in First Degree Relatives:       FH of Kidney Dz: No FH of DM: No       FH of HTN: No  FH of CAD: No       FH of Cancer: Yes   FH of Kidney Cancer:  No    Personal History:   Social History     Socioeconomic History     Marital status:      Spouse name: Not on file     Number of children: Not on file     Years of education: Not on file     Highest education level: Not on file   Occupational History     Not on file   Tobacco Use     Smoking status: Current Every Day Smoker     Packs/day: 1.00     Years: 30.00     Pack years: 30.00     Types: Cigarettes     Smokeless tobacco: Never Used     Tobacco comment: Smoking for 14 yrs   Substance and Sexual Activity     Alcohol use: No     Drug use: Yes     Types: Marijuana     Comment: Reffer occationly (twice a month)     Sexual activity: Yes     Partners: Female     Comment: No birth control used   Other Topics Concern      Service No     Blood Transfusions No     Caffeine Concern No     Occupational Exposure No     Hobby Hazards Yes     Comment: playing flag football (reason for the surgery)     Sleep Concern No     Stress Concern No     Weight Concern No     Special Diet No     Back Care No     Exercise Yes     Comment: 3-4 times a week     Bike Helmet No     Seat Belt Yes     Self-Exams No   Social History Narrative     Not on file     Social Determinants of Health     Financial Resource Strain:      Difficulty of Paying Living Expenses:    Food Insecurity:      Worried About Running Out of Food in the Last Year:      Ran Out of Food in the Last Year:    Transportation Needs:      Lack of Transportation (Medical):      Lack of Transportation (Non-Medical):    Physical Activity:      Days of Exercise per Week:      Minutes of Exercise per Session:    Stress:      Feeling of Stress :    Social Connections:      Frequency of Communication with Friends and Family:      Frequency of Social Gatherings with Friends and Family:      Attends [employer] Services:      Active Member of Clubs or Organizations:      Attends Club or Organization Meetings:      Marital Status:    Intimate Partner Violence:      Fear  of Current or Ex-Partner:      Emotionally Abused:      Physically Abused:      Sexually Abused:           Specific Social History:       Health Insurance Status: Yes       Employment Status: Full time  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 (Patient not taking: Reported on 10/28/2021)     No current facility-administered medications for this visit.     There are no discontinued medications.      Vitals:  Vital Signs 10/28/2021 10/28/2021 10/28/2021   Systolic 125 131 128   Diastolic 82 83 83   Pulse - - -   Temperature - - -   Weight (LB) - - -   Height - - -   BMI (Calculated) - - -   O2 - - -       Exam:   GENERAL APPEARANCE: alert and no distress  HENT: mouth without ulcers or lesions  LYMPHATICS: no cervical or supraclavicular nodes  RESP: lungs clear to auscultation - no rales, rhonchi or wheezes  CV: regular rhythm, normal rate, no rub, no murmur  EDEMA: no LE edema bilaterally  ABDOMEN: soft, nondistended, nontender, bowel sounds normal  MS: extremities normal - no gross deformities noted, no evidence of inflammation in joints, no muscle tenderness  SKIN: no rash    Results:   Labs and imaging were ordered for this visit and reviewed by me.  Recent Results (from the past 336 hour(s))   Comprehensive metabolic panel    Collection Time: 10/25/21  7:40 AM   Result Value Ref Range    Sodium 136 133 - 144 mmol/L    Potassium 3.6 3.4 - 5.3 mmol/L    Chloride 105 94 - 109 mmol/L    Carbon Dioxide (CO2) 28 20 - 32 mmol/L    Anion Gap 3 3 - 14 mmol/L    Urea Nitrogen 10 7 - 30 mg/dL    Creatinine  0.95 0.66 - 1.25 mg/dL    Calcium 8.9 8.5 - 10.1 mg/dL    Glucose 93 70 - 99 mg/dL    Alkaline Phosphatase 57 40 - 150 U/L    AST 18 0 - 45 U/L    ALT 32 0 - 70 U/L    Protein Total 7.0 6.8 - 8.8 g/dL    Albumin 3.7 3.4 - 5.0 g/dL    Bilirubin Total 1.0 0.2 - 1.3 mg/dL    GFR Estimate >90 >60 mL/min/1.73m2   Lipid Profile    Collection Time: 10/25/21  7:40 AM   Result Value Ref Range    Cholesterol 224 (H) <200 mg/dL    Triglycerides 109 <150 mg/dL    Direct Measure HDL 50 >=40 mg/dL    LDL Cholesterol Calculated 152 (H) <=100 mg/dL    Non HDL Cholesterol 174 (H) <130 mg/dL    Patient Fasting > 8hrs? Yes    Uric acid    Collection Time: 10/25/21  7:40 AM   Result Value Ref Range    Uric Acid 4.7 3.5 - 7.2 mg/dL   Phosphorus    Collection Time: 10/25/21  7:40 AM   Result Value Ref Range    Phosphorus 2.8 2.5 - 4.5 mg/dL   Hemoglobin A1c    Collection Time: 10/25/21  7:40 AM   Result Value Ref Range    Hemoglobin A1C 5.0 0.0 - 5.6 %   INR    Collection Time: 10/25/21  7:40 AM   Result Value Ref Range    INR 1.06 0.85 - 1.15   Partial thromboplastin time    Collection Time: 10/25/21  7:40 AM   Result Value Ref Range    aPTT 30 22 - 38 Seconds   CBC with platelets    Collection Time: 10/25/21  7:40 AM   Result Value Ref Range    WBC Count 8.4 4.0 - 11.0 10e3/uL    RBC Count 5.13 4.40 - 5.90 10e6/uL    Hemoglobin 15.7 13.3 - 17.7 g/dL    Hematocrit 45.9 40.0 - 53.0 %    MCV 90 78 - 100 fL    MCH 30.6 26.5 - 33.0 pg    MCHC 34.2 31.5 - 36.5 g/dL    RDW 12.4 10.0 - 15.0 %    Platelet Count 269 150 - 450 10e3/uL   Hepatitis B core antibody    Collection Time: 10/25/21  7:40 AM   Result Value Ref Range    Hepatitis B Core Antibody Total Nonreactive Nonreactive   Hepatitis B Surface Antibody    Collection Time: 10/25/21  7:40 AM   Result Value Ref Range    Hepatitis B Surface Antibody 0.56 <8.00 m[IU]/mL   Hepatitis B surface antigen    Collection Time: 10/25/21  7:40 AM   Result Value Ref Range    Hepatitis B Surface Antigen  Nonreactive Nonreactive   Hepatitis C antibody    Collection Time: 10/25/21  7:40 AM   Result Value Ref Range    Hepatitis C Antibody Nonreactive Nonreactive   HIV Antigen Antibody Combo Pretransplant    Collection Time: 10/25/21  7:40 AM   Result Value Ref Range    HIV Antigen Antibody Combo Pretransplant Nonreactive Nonreactive   Treponema Abs w Reflex to RPR and Titer    Collection Time: 10/25/21  7:40 AM   Result Value Ref Range    Treponema Antibody Total Nonreactive Nonreactive   West Nile Virus Antibody IgG IgM    Collection Time: 10/25/21  7:40 AM   Result Value Ref Range    West Nile IgG Serum 0.08 <=1.29 IV    West Nile IgM Serum 0.01 <=0.89 IV   CMV Antibody IgG    Collection Time: 10/25/21  7:40 AM   Result Value Ref Range    CMV Sofy IgG Instrument Value >10.00 (H) <0.60 U/mL    CMV Antibody IgG Positive, suggests recent or past exposure. (A) No detectable antibody.    EBV Capsid Antibody IgG    Collection Time: 10/25/21  7:40 AM   Result Value Ref Range    EBV Capsid Sofy IgG Instrument Value >750.0 (H) <18.0 U/mL    EBV Capsid Antibody IgG Positive (A) No detectable antibody.   EBV Capsid Antibody IgM    Collection Time: 10/25/21  7:40 AM   Result Value Ref Range    EBV Capsid Sofy IgM Instrument Value <10.0 <36.0 U/mL    EBV Capsid Antibody IgM No detectable antibody. No detectable antibody.   Asymptomatic COVID-19 Virus (Coronavirus) by PCR Nose    Collection Time: 10/25/21  7:40 AM    Specimen: Nose; Swab   Result Value Ref Range    SARS CoV2 PCR Negative Negative   Adult Type and Screen    Collection Time: 10/25/21  7:40 AM   Result Value Ref Range    ABO/RH(D) O POS     Antibody Screen Negative    Quantiferon TB Gold Plus Grey Tube    Collection Time: 10/25/21  7:40 AM    Specimen: Peripheral Blood   Result Value Ref Range    Quantiferon Nil Tube 0.04 IU/mL   Quantiferon TB Gold Plus Green Tube    Collection Time: 10/25/21  7:40 AM    Specimen: Peripheral Blood   Result Value Ref Range     Quantiferon TB1 Tube 0.07 IU/mL   Quantiferon TB Gold Plus Yellow Tube    Collection Time: 10/25/21  7:40 AM    Specimen: Peripheral Blood   Result Value Ref Range    Quantiferon TB2 Tube 0.11    Quantiferon TB Gold Plus Purple Tube    Collection Time: 10/25/21  7:40 AM    Specimen: Peripheral Blood   Result Value Ref Range    Quantiferon Mitogen 10.00 IU/mL   Quantiferon TB Gold Plus    Collection Time: 10/25/21  7:40 AM    Specimen: Peripheral Blood   Result Value Ref Range    Quantiferon-TB Gold Plus Negative Negative    TB1 Ag minus Nil Value 0.03 IU/mL    TB2 Ag minus Nil Value 0.07 IU/mL    Mitogen minus Nil Result 9.96 IU/mL    Nil Result 0.04 IU/mL   ABO and Rh 2nd type and screen required    Collection Time: 10/25/21  7:41 AM   Result Value Ref Range    ABO/RH(D) O POS     SPECIMEN EXPIRATION DATE     Routine UA with microscopic    Collection Time: 10/25/21  8:16 AM   Result Value Ref Range    Color Urine Yellow Colorless, Straw, Light Yellow, Yellow    Appearance Urine Clear Clear    Glucose Urine Negative Negative mg/dL    Bilirubin Urine Negative Negative    Ketones Urine Negative Negative mg/dL    Specific Gravity Urine 1.013 1.003 - 1.035    Blood Urine Negative Negative    pH Urine 5.0 5.0 - 7.0    Protein Albumin Urine Negative Negative mg/dL    Urobilinogen Urine Normal Normal, 2.0 mg/dL    Nitrite Urine Negative Negative    Leukocyte Esterase Urine Negative Negative    Mucus Urine Present (A) None Seen /LPF    RBC Urine 1 <=2 /HPF    WBC Urine 1 <=5 /HPF   Albumin Random Urine Quantitative with Creat Ratio    Collection Time: 10/25/21  8:17 AM   Result Value Ref Range    Creatinine Urine mg/dL 165 mg/dL    Albumin Urine mg/L 6 mg/L    Albumin Urine mg/g Cr 3.64 0.00 - 17.00 mg/g Cr   Protein  random urine with Creat Ratio    Collection Time: 10/25/21  8:17 AM   Result Value Ref Range    Total Protein Random Urine g/L 0.08 g/L    Total Protein Urine g/gr Creatinine 0.05 0.00 - 0.20 g/g Cr     Creatinine Urine mg/dL 165 mg/dL               Again, thank you for allowing me to participate in the care of your patient.      Sincerely,     Kidney Donor Stephen

## 2021-10-28 NOTE — PROGRESS NOTES
Psychosocial Evaluation  Living Organ Donation per OPTN Policy 14.1.A  Organ Type: unrelated, kidney  Presenting Information:  Mr. Buddy Marshall presents to the [facility] to complete a psychosocial evaluation since he is interested in becoming a kidney donor for his step father, Mr. Ramiro Vyas age 72.    PERSONAL BACKGROUND:  Current Living Situation: Ethan lives in [place].  He lives with his wife and kids in a single family home that they own.    Education/Employment/Financial Situation: Ethan finished high school.  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  Ethan's wife is a nurse.  She is not working at this time either due to mental health problems.  Ethan is worried about his financial situation.  He has signed up for SNAP benefits for he and his family (Supplemental Nutrition Assistance Program - [place]), and other cash assistance programs.  We discussed the [place] [employer] assistance Program that helps homeowners with energy bills during the winter months.           Health Insurance Status: He and his family have Medical Assistance through [place]'s medicaid program.       Family History: Ethan is  and has 2 young children.  This is his second marriage.  He has one child from a previous relationship.    General Health: Ethan [facility] in [facility] He sees Dr. De Jesus for his primary care needs.     Mental Health:   Ethan denies any past or present treatment for mental health issues, such as anxiety, depression, bipolar disorder, or disorders of thought such as schizophrenia or schizoaffective disorder.  There is no history of personality disorder or eating disorders.  The donor denies any need to see a counselor or therapist at this time.  The donor denies any past suicidal ideation, plans, or past attempts.  The donor denies any use of psychotropic medications at this time or in the  past.  The donor denies any past history of hospitalization for psychiatric illnesses.  The donor denies any past history of ADHD or ADD.  The donor denies any history of educational issues or need for special educational services in their past history.    Alcohol and Drug Use/Abuse/Dependency: Buddy reports that he consumes approximately 2 servings of alcohol per year ( a serving is defined here as one, 12 oz beer, or one 4 oz glass of wine, or one 1 /2 oz of hard liquor).  The donor denies any past history of abuse or dependency on alcohol or illicit drugs. The donor denies any current use of street drugs, including marijuana (smokes it and uses it at night and uses it one bowl), vaping, edible marijuana, or other mood altering substances.  The donor denies any past history of negative consequences of use of alcohol or drugs such as a DUI, relationship problems, problems with fulfilling parenting or other care giving responsibilities, or problems with work performance.       Cigarette Use: smokes 1 pack per day and has been a smoker for 30 years.  He has been counseled to quit.  He has tried in the past and has used Chantix to help, but he did not find that medication helpful to him.     Legal: no legal issues    Coping with major surgery/associated stress: Buddy likes to golf for stress relief. He likes TV.  He likes to do art:  Drawing and painting.      Support System: His wife is supportive, but they have not talked details yet.  She has agreed to be his caregiver.  They have family they can ask for some financial help.      DONOR SPECIFIC INFORMATION:  Relationship to Recipient: Mr. Buddy Marshall wants to donate a kidney to his step father, Mr. Ramiro Vyas, age 72.    Decision Process/Motivation to Donate: Ethan tells me that his step father is on dialysis now and has been for a couple of years.  He wants to live live a little happier than he is now.  Mr. Vyas has been Buddy's step dad for  25 years.  Mr. Vyas had a stroke.  He has 4 kids from a previous relationship who are all a mess and Ethan feels that dealing with his other adult children caused him to have some of his health problems, including his stroke. Ethan denies feeling any pressure or coercion to be a donor.  He denies being offered any form of payment to be a donor.    PREPARATION FOR DONATION, RECOVERY, AND POTENTIAL SHORT-LONG-TERM OUTCOMES:  Understanding of the Living Donation Process:   We discussed the role of the donor  and Independent Donor Advocate.  Short and long term medical and psychosocial risks to both, donor and recipient were reviewed and he voices his understanding of these risks.  High risk behaviors as defined by [place] Public Health Services (PHS) that have potential to increase risk of disease transmission were reviewed and he denies any high risk behaviors. Post surgical restrictions (2 weeks no driving, 6 weeks no lifting over 10 lbs) were reviewed and he appears capable of adhering to the post surgical requirements. The need for a caregiver was discussed and he has a caregiver, his wife.  However, she is currently not working dur to her own mental health problems and it is unclear who would care for their young children while he was recovering from surgery.  The risk of poor psychosocial outcome including problems with body image, post-surgery depression or anxiety, or feelings of emotional distress or bereavement if recipient experiences any recurrent disease, poor outcome or death was reviewed.  Additionally, potential financial implications, including the risk of having difficulty obtaining health care insurance, life insurance, disability insurance, or long term care insurance were reviewed, as were available donor grants to assist with donor related expenses.      We also discussed some unique issues that arise with paired kidney donation, which include the uncertainty of the timing and the  importance of having a employment situation and support system that is able to provide sustained support and flexibility.    Mr. Marshall appears capable of understanding this information and making an informed medical decision.    Impressions/Recommendations:   Mr. Buddy Marshall  is highly motivated to donate a kidney  to his step-father, Mr. Ramiro Vyas age 72.  His decision to donate is free of inducement, coercion, or other undue pressure.   His housing, finances and employment are NOT  stable.  Mr. Marshall is not currently working nor is he able to collect unemployment.  He is worried about making his mortgage payment and may need to ask family for help.  No current/active mental health or chemical abuse issues were identified.  The need for a caregiver was reviewed and Ethan is not currently able to identify a plan to meet his post operative care needs.  Mr. Marshall's wife is currently out of work on leave to due to her own mental health issues.  Mr. Marshall appears capable of making an informed medical decision.  No psychosocial contraindications to living organ donation were identified and  I do not support Mr. Marshall s desire to donate a kidney to his step father, Mr. Ramiro Vyas age 72 at this time.  He will need to work on stabilizing he own financial situation first before he is in a place to give to his step father.  This is something we discussed at length during his evaluation and he is in agreement with my recommendations.     Contact Information:   MIKO Sexton, [facility]  Donor  and Independent Living Donor Advocate  [facility]  Pager:  [phone]  Direct:  [phone] Cell   E-Mail:  [email or web address]    Time Spent: 60 minutes      Living Kidney Donor Consent per OPTN Policy 14.3.A for Independent Living Donor Advocate (RENETTA)    Written assurance has been obtained from the potential donor that he/she:    Is willing to donate  Is free from inducement and coercion  Has been informed that the he/she may decline to donate at any time  Has been informed that transplant centers must:   A) Offer donors an opportunity to discontinue the donor consent or evaluation process in a way that is protected and confidential  B) Provide an independent living donor advocate (RENETTA) to assist the potential donor during this process    The following was presented to the potential donor in a language in which the potential donor is able to engage in meaningful dialogue:   Education and instruction about all phases of the living donation process including:   Consent  Medical and psychosocial evaluation  Information about the surgical procedure  Pre and post operative care  Benefits of post operative follow up  Disclosure that the [facility] will take all reasonable precautions to provide confidentiality for the donor/recipient  Disclosure that it is a federal crime for any person to knowingly acquire, obtain or otherwise transfer any human organ for valuable consideration  Disclosure that the [facility] must provide an independent living donor advocate (RENETTA)  Disclosure that health information obtained during the evaluation is subject to the same regulations as all records and could reveal conditions that must be reported to local, state, or federal public health authorities  Disclosure that the [facility] is required to report living donor follow up information at 6 months, 1 year, and 2 years, and that the potential donor must commit to post operative follow up testing coordinated by the [facility]    Disclosure has been provided that these risks may be transient or permanent & include but are not limited to:  Potential psychosocial risks:  Problems with body image  Post-surgery depression or anxiety  Feelings of emotional distress or bereavement if recipient experiences any recurrent disease or in the  event of the recipient s death  Impact of donation on the donor s lifestyle    Potential financial impacts:  Personal expenses of travel, housing, , lost wages related to donation might not be reimbursed. However, resources may be available to defray some donation-related expenses   Need for life-long follow up at the donor s expense  Loss of employment or income  Negative impact on the ability to obtain future employment  Negative impact on the ability to obtain, maintain, or afford health, disability, and life insurance  Future health problems experienced by living donors following donation may not be covered by the recipient s insurance    Contact Information:  MIKO Sexton, [facility]  Donor  and Independent Living Donor Advocate  [facility] [place] [facility]  Pager:  [phone]  Direct:  [phone] Cell   E-Mail:  [email or web address]    Time Spent: 60 minutes

## 2021-10-28 NOTE — LETTER
10/28/2021         RE: Buddy Marshall  [address] [address]        Dear Colleague,    Thank you for referring your patient, Buddy Marshall, to the [facility]. Please see a copy of my visit note below.    Donor Iohexol test    Buddy Marshall presents today to [facility] for a Donor Iohexol test.      Progress note:  ID verified by name and .     The following information was verified with the patient:  Female Patients is there any possibility of being pregnant n/a  Is there a history of allergy (skin rash, swelling, ect) to:   A.  Iodine (except skin reactions to betadine): No   B. Intravenous radio-contrast agents: No   C. Seafood No     present during visit today: Not Applicable.    R.N. provided patient with educational handout regarding timed test. Yes    24 g piv placed in right ac and Iohexol administered over 2 minutes.  Positive blood return verified before and after injection. piv removed.  20 gauge PIV placed in left  for blood draws and CT this afternoon.    Medication administered:  Iohexol (Omnipaque 300mg iodine/ml concentration) 5 mls.    Start time: 710  Stop time: 712    Drug Waste Record    Drug Name: iohexol  Dose: 5ml  Route administered: IV  NDC #: 0407-1413-10  Amount of waste(mL):5ml  Reason for waste: Single use vial    Administrations This Visit     iohexol (OMNIPAQUE 300) injection 5 mL     Admin Date  10/28/2021 Action  Given Dose  5 mL Route  Intravenous Administered By  Marilynn Guzman RN                Evaluation nurse in transplant to draw labs at 2 and 4 hours post iohexol administration.  Patient given a slip with the times to get labs drawn and verbalized understanding of the plan.    Patient tolerated the procedure:  Yes    After the infusion patient was discharged to the next appointment.    Marilynn Guzman RN          Again, thank you for allowing me to participate in the care of your patient.       Sincerely,    [facility]

## 2021-10-28 NOTE — LETTER
10/28/2021         RE: Buddy Marshall  [address] [facility] [address]        Dear Colleague,    Thank you for referring your patient, Buddy Marshall, to the [facility]. Please see a copy of my visit note below.    Transplant Surgery Consult Note    Medical record number: [id number]  YOB: 1975,   Consult requested by the patient for evaluation of kidney donation candidacy.    Assessment and Recommendations: Mr. Marshall appears to be a good candidate for kidney donation at this point in the evaluation. The following issues will need to be addressed prior to formal review:  Complete medical and imaging evaluation  Needs eval for kidney stones, strong family history.     The majority of our visit today was spent in counseling regarding the medical and surgical risks of kidney donation, the typical jane-and post-operative experience and recovery/return to work pattern.  We also talked about post-op visits and longer term health care maintenance, as well as the implications of having one remaining kidney. This discussion included, but was not limited to rates of complications such as bleeding, infection, need for transfusion, reoperation, other organ injury, future bowel obstruction, incisional hernia, port site pain, varicocele, venous thrombosis, pulmonary embolism, renal failure, and death (3 per 10,000). The patient understands that if end stage renal failure happens that dialysis or transplant would be required.  At the conclusion of the visit, all questions had been answered and Mr. Marshall's candidacy for donation will be reviewed at our Multidisciplinary Donor Selection Committee. He will stay in contact with the nurse coordinator with any concerns.      Total time: 45 minutes        Toya Casey MD  ---------------------------------------------------------------------------------------------------    HPI: Buddy Marshall is a 46 year old year old male  who presents for a kidney donor evaluation.  Patient would like to donate to his step fathert.      Personal history of:   No    Yes  Cancer:    [x]      []             Comment:     Diabetes   [x]      []  Comment:    Thombosis   [x]      []  Comment:       Hepatitis   [x]      []  Comment:    Tuberculosis   [x]      []  Comment:   Back or neck pain:  []      [x]  Comment:    No currently but had back surgery   Kidney stones   [x]      []  Comment:                  Kidney infections  [x]      []  Comment:           Urinary retention  [x]      []            Comment:   Regular NSAID use:  [x]      []            Comment:      Constipation:   [x]      []            Comment:      Mormon  [x]      []            Comment:      Other:    [x]      []            Comment:         Past Medical History:   Diagnosis Date     Back pain     resolved after surgery in 2010     NO ACTIVE PROBLEMS      TOBACCO ABUSE-CONTINUOUS      Past Surgical History:   Procedure Laterality Date     ADENOIDECTOMY       DISKECTOMY, LUMBAR, ADDTNL SP  2010     KNEE SURGERY  2006     PE TUBES  as a child     VASECTOMY  2021     Family History   Problem Relation Age of Onset     Gynecology Mother      Cancer Mother      Alcohol/Drug Father      Asthma Maternal Grandmother      Hypertension Maternal Grandmother      Cardiovascular Maternal Grandmother         at age 56     Circulatory Maternal Grandmother         spleen removed     Heart Disease Maternal Grandmother         angioplasty     Nephrolithiasis Maternal Grandfather      Alzheimer Disease Paternal Grandmother      Heart Disease Paternal Grandmother      Social History     Socioeconomic History     Marital status:      Spouse name: Not on file     Number of children: Not on file     Years of education: Not on file     Highest education level: Not on file   Occupational History     Not on file   Tobacco Use     Smoking status: Current Every Day Smoker     Packs/day: 1.00      Years: 15.00     Pack years: 15.00     Types: Cigarettes     Smokeless tobacco: Never Used     Tobacco comment: Smoking for 14 yrs   Substance and Sexual Activity     Alcohol use: No     Drug use: Yes     Comment: Reffer occationly (twice a month)     Sexual activity: Yes     Partners: Female     Comment: No birth control used   Other Topics Concern      Service No     Blood Transfusions No     Caffeine Concern No     Occupational Exposure No     Hobby Hazards Yes     Comment: playing flag football (reason for the surgery)     Sleep Concern No     Stress Concern No     Weight Concern No     Special Diet No     Back Care No     Exercise Yes     Comment: 3-4 times a week     Bike Helmet No     Seat Belt Yes     Self-Exams No   Social History Narrative     Not on file     Social Determinants of Health     Financial Resource Strain:      Difficulty of Paying Living Expenses:    Food Insecurity:      Worried About Running Out of Food in the Last Year:      Ran Out of Food in the Last Year:    Transportation Needs:      Lack of Transportation (Medical):      Lack of Transportation (Non-Medical):    Physical Activity:      Days of Exercise per Week:      Minutes of Exercise per Session:    Stress:      Feeling of Stress :    Social Connections:      Frequency of Communication with Friends and Family:      Frequency of Social Gatherings with Friends and Family:      Attends [employer] Services:      Active Member of Clubs or Organizations:      Attends Club or Organization Meetings:      Marital Status:    Intimate Partner Violence:      Fear of Current or Ex-Partner:      Emotionally Abused:      Physically Abused:      Sexually Abused:        ROS:   CONSTITUTIONAL:  No fevers or chills  EYES: negative for icterus  ENT:  negative for hearing loss, tinnitus and sore throat  RESPIRATORY:  negative for cough, sputum, dyspnea  CARDIOVASCULAR:  negative for chest pain  GASTROINTESTINAL:  negative for nausea, vomiting,  diarrhea or constipation  GENITOURINARY:  negative for incontinence, dysuria, bladder emptying problems  HEME:  No easy bruising  INTEGUMENT:  negative for rash and pruritus  NEURO:  Negative for headache, seizure disorder    Allergies:   No Known Allergies    Medications:  Prescription Medications as of 10/28/2021       Rx Number Disp Refills Start End Last Dispensed Date Next Fill Date Owning Pharmacy    Splore STARTING MONTH MONCHO 0.5 MG X 11 & 1 MG X 14 OR MISC  1 pack 0 12/22/2006        Sig: take as directed    Route: Oral    NO ACTIVE MEDICATIONS            Sig: .    Class: Historical      Clinic-Administered Medications as of 10/28/2021       Dose Frequency Start End    iohexol (OMNIPAQUE 300) injection 5 mL (Completed) 5 mL ONCE 10/28/2021 10/28/2021    Route: Intravenous          Exam:   Pulse:  [45] 45  BP: (125-131)/(82-83) 128/83  SpO2:  [98 %] 98 %  Body mass index is 26.5 kg/m .  Pulse:  [45] 45  BP: (125-131)/(82-83) 128/83  SpO2:  [98 %] 98 %  Appearance: in no apparent distress.   Skin: normal  Head and Neck: Normal, no rashes or jaundice  Respiratory: normal respiratory excursions, no audible wheeze  Cardiovascular: RRR  Abdomen: flat, No distention   Extremeties: Edema, none  Neuro: without deficit       Diagnostics:   Recent Results (from the past 336 hour(s))   Comprehensive metabolic panel    Collection Time: 10/25/21  7:40 AM   Result Value Ref Range    Sodium 136 133 - 144 mmol/L    Potassium 3.6 3.4 - 5.3 mmol/L    Chloride 105 94 - 109 mmol/L    Carbon Dioxide (CO2) 28 20 - 32 mmol/L    Anion Gap 3 3 - 14 mmol/L    Urea Nitrogen 10 7 - 30 mg/dL    Creatinine 0.95 0.66 - 1.25 mg/dL    Calcium 8.9 8.5 - 10.1 mg/dL    Glucose 93 70 - 99 mg/dL    Alkaline Phosphatase 57 40 - 150 U/L    AST 18 0 - 45 U/L    ALT 32 0 - 70 U/L    Protein Total 7.0 6.8 - 8.8 g/dL    Albumin 3.7 3.4 - 5.0 g/dL    Bilirubin Total 1.0 0.2 - 1.3 mg/dL    GFR Estimate >90 >60 mL/min/1.73m2   Lipid Profile    Collection  Time: 10/25/21  7:40 AM   Result Value Ref Range    Cholesterol 224 (H) <200 mg/dL    Triglycerides 109 <150 mg/dL    Direct Measure HDL 50 >=40 mg/dL    LDL Cholesterol Calculated 152 (H) <=100 mg/dL    Non HDL Cholesterol 174 (H) <130 mg/dL    Patient Fasting > 8hrs? Yes    Uric acid    Collection Time: 10/25/21  7:40 AM   Result Value Ref Range    Uric Acid 4.7 3.5 - 7.2 mg/dL   Phosphorus    Collection Time: 10/25/21  7:40 AM   Result Value Ref Range    Phosphorus 2.8 2.5 - 4.5 mg/dL   Hemoglobin A1c    Collection Time: 10/25/21  7:40 AM   Result Value Ref Range    Hemoglobin A1C 5.0 0.0 - 5.6 %   INR    Collection Time: 10/25/21  7:40 AM   Result Value Ref Range    INR 1.06 0.85 - 1.15   Partial thromboplastin time    Collection Time: 10/25/21  7:40 AM   Result Value Ref Range    aPTT 30 22 - 38 Seconds   CBC with platelets    Collection Time: 10/25/21  7:40 AM   Result Value Ref Range    WBC Count 8.4 4.0 - 11.0 10e3/uL    RBC Count 5.13 4.40 - 5.90 10e6/uL    Hemoglobin 15.7 13.3 - 17.7 g/dL    Hematocrit 45.9 40.0 - 53.0 %    MCV 90 78 - 100 fL    MCH 30.6 26.5 - 33.0 pg    MCHC 34.2 31.5 - 36.5 g/dL    RDW 12.4 10.0 - 15.0 %    Platelet Count 269 150 - 450 10e3/uL   Hepatitis B core antibody    Collection Time: 10/25/21  7:40 AM   Result Value Ref Range    Hepatitis B Core Antibody Total Nonreactive Nonreactive   Hepatitis B Surface Antibody    Collection Time: 10/25/21  7:40 AM   Result Value Ref Range    Hepatitis B Surface Antibody 0.56 <8.00 m[IU]/mL   Hepatitis B surface antigen    Collection Time: 10/25/21  7:40 AM   Result Value Ref Range    Hepatitis B Surface Antigen Nonreactive Nonreactive   Hepatitis C antibody    Collection Time: 10/25/21  7:40 AM   Result Value Ref Range    Hepatitis C Antibody Nonreactive Nonreactive   HIV Antigen Antibody Combo Pretransplant    Collection Time: 10/25/21  7:40 AM   Result Value Ref Range    HIV Antigen Antibody Combo Pretransplant Nonreactive Nonreactive    Treponema Abs w Reflex to RPR and Titer    Collection Time: 10/25/21  7:40 AM   Result Value Ref Range    Treponema Antibody Total Nonreactive Nonreactive   West Nile Virus Antibody IgG IgM    Collection Time: 10/25/21  7:40 AM   Result Value Ref Range    West Nile IgG Serum 0.08 <=1.29 IV    West Nile IgM Serum 0.01 <=0.89 IV   CMV Antibody IgG    Collection Time: 10/25/21  7:40 AM   Result Value Ref Range    CMV Sofy IgG Instrument Value >10.00 (H) <0.60 U/mL    CMV Antibody IgG Positive, suggests recent or past exposure. (A) No detectable antibody.    EBV Capsid Antibody IgG    Collection Time: 10/25/21  7:40 AM   Result Value Ref Range    EBV Capsid Sofy IgG Instrument Value >750.0 (H) <18.0 U/mL    EBV Capsid Antibody IgG Positive (A) No detectable antibody.   EBV Capsid Antibody IgM    Collection Time: 10/25/21  7:40 AM   Result Value Ref Range    EBV Capsid Sofy IgM Instrument Value <10.0 <36.0 U/mL    EBV Capsid Antibody IgM No detectable antibody. No detectable antibody.   Asymptomatic COVID-19 Virus (Coronavirus) by PCR Nose    Collection Time: 10/25/21  7:40 AM    Specimen: Nose; Swab   Result Value Ref Range    SARS CoV2 PCR Negative Negative   Adult Type and Screen    Collection Time: 10/25/21  7:40 AM   Result Value Ref Range    ABO/RH(D) O POS     Antibody Screen Negative    Quantiferon TB Gold Plus Grey Tube    Collection Time: 10/25/21  7:40 AM    Specimen: Peripheral Blood   Result Value Ref Range    Quantiferon Nil Tube 0.04 IU/mL   Quantiferon TB Gold Plus Green Tube    Collection Time: 10/25/21  7:40 AM    Specimen: Peripheral Blood   Result Value Ref Range    Quantiferon TB1 Tube 0.07 IU/mL   Quantiferon TB Gold Plus Yellow Tube    Collection Time: 10/25/21  7:40 AM    Specimen: Peripheral Blood   Result Value Ref Range    Quantiferon TB2 Tube 0.11    Quantiferon TB Gold Plus Purple Tube    Collection Time: 10/25/21  7:40 AM    Specimen: Peripheral Blood   Result Value Ref Range    Quantiferon  Mitogen 10.00 IU/mL   Quantiferon TB Gold Plus    Collection Time: 10/25/21  7:40 AM    Specimen: Peripheral Blood   Result Value Ref Range    Quantiferon-TB Gold Plus Negative Negative    TB1 Ag minus Nil Value 0.03 IU/mL    TB2 Ag minus Nil Value 0.07 IU/mL    Mitogen minus Nil Result 9.96 IU/mL    Nil Result 0.04 IU/mL   ABO and Rh 2nd type and screen required    Collection Time: 10/25/21  7:41 AM   Result Value Ref Range    ABO/RH(D) O POS     SPECIMEN EXPIRATION DATE     Routine UA with microscopic    Collection Time: 10/25/21  8:16 AM   Result Value Ref Range    Color Urine Yellow Colorless, Straw, Light Yellow, Yellow    Appearance Urine Clear Clear    Glucose Urine Negative Negative mg/dL    Bilirubin Urine Negative Negative    Ketones Urine Negative Negative mg/dL    Specific Gravity Urine 1.013 1.003 - 1.035    Blood Urine Negative Negative    pH Urine 5.0 5.0 - 7.0    Protein Albumin Urine Negative Negative mg/dL    Urobilinogen Urine Normal Normal, 2.0 mg/dL    Nitrite Urine Negative Negative    Leukocyte Esterase Urine Negative Negative    Mucus Urine Present (A) None Seen /LPF    RBC Urine 1 <=2 /HPF    WBC Urine 1 <=5 /HPF   Albumin Random Urine Quantitative with Creat Ratio    Collection Time: 10/25/21  8:17 AM   Result Value Ref Range    Creatinine Urine mg/dL 165 mg/dL    Albumin Urine mg/L 6 mg/L    Albumin Urine mg/g Cr 3.64 0.00 - 17.00 mg/g Cr   Protein  random urine with Creat Ratio    Collection Time: 10/25/21  8:17 AM   Result Value Ref Range    Total Protein Random Urine g/L 0.08 g/L    Total Protein Urine g/gr Creatinine 0.05 0.00 - 0.20 g/g Cr    Creatinine Urine mg/dL 165 mg/dL             Again, thank you for allowing me to participate in the care of your patient.        Sincerely,        Toya Casey MD

## 2021-10-28 NOTE — PATIENT INSTRUCTIONS
[employer]:   Call the [place] Food Help Line at 1-419.992.3368 to talk with a specialist in community and government food assistance programs. They can help you sign-up for SNAP benefits, find food aldana, food shelves and free food in your community and help refer you to any other community assistance programs that you qualify for.   https://www.hungersolutions.org/find-help/    Fare For All:  Provides discounted groceries. Up to 40% off retail pricing. You can preorder and  or buy in person, depending on the site. Visit their website for list of 38 locations in [place].  [email or web address] [email or web address]/    [place] [facility]:  To find a map of food shelves and food distribution pop-ups. Visit www.[employer].gov/foodshelves    To find map of winter farmers markets (nearly all accept SNAP-EBT benefits). Visit   https://[employer].org/winter-markets/  www.[employer].gov/farmersmarkets    Supplemental Nutrition Assistance Program (SNAP):  https://[place].gov/dhs/people-we-serve/adults/economic-assistance/food-nutrition/programs-and-services/supplemental-nutrition-assistance-program.jsp

## 2021-10-28 NOTE — NURSING NOTE
Saw Buddy in clinic on 10/28/21 for Living Kidney Donor Evaluation.     He is interested in donation for his step dad.    I provided a folder which included copies of the followin. Living Kidney Donor Evaluation Consent  2. Paired Exchange Consent  3. Donor Shield Pamphlet  4. Living Donor Collective Study information  5. Kidney for Life pamphlet  6. Kidney Donors are Heroes! Study synopsis  7. SRTR Data from 21.      I reviewed the Living Kidney Donor Evaluation Consent, dated 2020 and Paired Exchange/ NDD consent dated 2018.  I answered any question.    Evaluation Notes:  APOL testing advised by Dr. Woods. Genetic testing consent obtained and sent for scan. APOL kit and order placed, kit sent.   Internal tissue typing collected and sent.

## 2021-10-29 ENCOUNTER — TRANSFERRED RECORDS (OUTPATIENT)
Dept: HEALTH INFORMATION MANAGEMENT | Facility: CLINIC | Age: 46
End: 2021-10-29

## 2021-10-29 DIAGNOSIS — Z00.5 TRANSPLANT DONOR EVALUATION: ICD-10-CM

## 2021-10-29 LAB
ATRIAL RATE - MUSE: 47 BPM
DIASTOLIC BLOOD PRESSURE - MUSE: NORMAL MMHG
INTERPRETATION ECG - MUSE: NORMAL
P AXIS - MUSE: 42 DEGREES
PR INTERVAL - MUSE: 172 MS
QRS DURATION - MUSE: 92 MS
QT - MUSE: 436 MS
QTC - MUSE: 385 MS
R AXIS - MUSE: 24 DEGREES
SYSTOLIC BLOOD PRESSURE - MUSE: NORMAL MMHG
T AXIS - MUSE: 25 DEGREES
VENTRICULAR RATE- MUSE: 47 BPM

## 2021-11-02 LAB
BSA: 2.02 M2
IOHEXOL CL UR+SERPL-VRATE: 102 ML/MIN
IOHEXOL CL UR+SERPL-VRATE: 3.51 MG/DL
IOHEXOL CL UR+SERPL-VRATE: 7.63 MG/DL
IOHEXOL CL UR+SERPL-VRATE: 88 /1.73 M2

## 2021-11-03 ENCOUNTER — COMMITTEE REVIEW (OUTPATIENT)
Dept: TRANSPLANT | Facility: CLINIC | Age: 46
End: 2021-11-03

## 2021-11-03 DIAGNOSIS — Z00.5 TRANSPLANT DONOR EVALUATION: Primary | ICD-10-CM

## 2021-11-03 NOTE — COMMITTEE REVIEW
Living Donor Committee Review Note Evaluation Date:   Committee Review Date: 11/3/2021    Donor being evaluated for: Kidney    Transplant Phase: Referral  Transplant Status: Active    Transplant Coordinator: Lanie Hernandez  Transplant Surgeon:       Committee Review Members:  Nephrology Julio Vázquez MD, Marcus Woods MD   Nutrition Esther Santos, HENRI   Pharmacist Constantino Chow, [facility]    - Clinical Shayy Brown, [facility]   Transplant Susan Pam Villalta, RN, Tiarra Norman, NP, Armen Enriquez, RN, Verónica Reyna, LPN, Tesha Hager, LINDA, Lanie Hernandez, LINDA, Joycelyn Brown, RN   Transplant Surgery Natalia Portillo MD, MD       Transplant Eligibility:     Committee Review Decision: Needs Re-presentation    Relative Contraindications:     Absolute Contraindications:     Committee Chair Natalia Portillo MD verbally attested to the committee's decision.    Committee Discussion Details:       Reviewed Buddy's abnormal evaluation findings:    -lipid panel- no further evaluation needed    -family hx kidney stones discussed- renal CT okay, no further evaluation needed    -EKG discussed- no further evaluation needed    Also of note:     -APOL1 results reviewed- no further evaluation needed, Buddy was appropriately counseled during evaluation, no follow up needed    -Chest CT needed as part of evaluation due to significant smoking history, should then have regular follow up with PCP for ongoing appropriate surveillance     -SW concerns reviewed- will need to have stable financial situation and complete follow up with SW prior to moving forward as a donor     CT previously reviewed:  November 2, 2021 5:32 PM - Lanie Hernandez RN:   Patient's abdominal CT reviewed on 11/2/21 with the following surgeon(s) present: Dr. Portillo  Suggested side for kidney for donation is LEFT.  Decision ultimately deferred to operating surgeon.   Incidental findings reviewed: none     Next Steps:    Call  patient to discuss results/reccomendations     If patient interested in continuing in donor eval, send appropriate orders/billing info    Buddy updated of above. He verbalized understanding and commitment to continue. He will follow up with SW and schedule chest CT. He knows he will need ongoing follow up with PCP regarding chest surveillance. Order placed and routed.

## 2021-11-04 LAB
A*: NORMAL
A*LOCUS SEROLOGIC EQUIVALENT: 2
A*LOCUS: NORMAL
A*SEROLOGIC EQUIVALENT: 3
ABTEST METHOD: NORMAL
B*: NORMAL
B*LOCUS SEROLOGIC EQUIVALENT: 35
B*LOCUS: NORMAL
B*SEROLOGIC EQUIVALENT: 45
BW-1: NORMAL
C*: NORMAL
C*LOCUS SEROLOGIC EQUIVALENT: 4
C*LOCUS: NORMAL
C*SEROLOGIC EQUIVALENT: 16
DPA1*: NORMAL
DPA1*LOCUS: NORMAL
DPB1*: NORMAL
DPB1*LOCUS NMDP: NORMAL
DPB1*LOCUS: NORMAL
DPB1*NMDP: NORMAL
DQA1*: NORMAL
DQA1*LOCUS: NORMAL
DQB1*: NORMAL
DQB1*LOCUS SEROLOGIC EQUIVALENT: 7
DQB1*LOCUS: NORMAL
DQB1*SEROLOGIC EQUIVALENT: 6
DRB1*: NORMAL
DRB1*LOCUS SEROLOGIC EQUIVALENT: 12
DRB1*LOCUS: NORMAL
DRB1*SEROLOGIC EQUIVALENT: 13
DRB3*: NORMAL
DRB3*LOCUS SEROLOGIC EQUIVALENT: 52
DRB3*LOCUS: NORMAL
DRB3*SEROLOGIC EQUIVALENT: 52
DRSSO TEST METHOD: NORMAL
Lab: NORMAL
Lab: NORMAL

## 2021-11-05 ENCOUNTER — ANCILLARY PROCEDURE (OUTPATIENT)
Dept: CT IMAGING | Facility: CLINIC | Age: 46
End: 2021-11-05
Attending: INTERNAL MEDICINE

## 2021-11-05 DIAGNOSIS — Z00.5 TRANSPLANT DONOR EVALUATION: ICD-10-CM

## 2021-11-05 PROCEDURE — 71250 CT THORAX DX C-: CPT | Mod: GC | Performed by: RADIOLOGY

## 2021-11-10 ENCOUNTER — TELEPHONE (OUTPATIENT)
Dept: TRANSPLANT | Facility: CLINIC | Age: 46
End: 2021-11-10

## 2021-11-10 NOTE — TELEPHONE ENCOUNTER
Patient Call: General  Route to LPN    Reason for call: Patient wanted to see what were the findings from the CT scan and what are next steps are for kidney donated.     Call back needed? Yes or mychart message      Return Call Needed  Same as documented in contacts section  When to return call?: Greater than one day: Route standard priority

## 2021-11-10 NOTE — TELEPHONE ENCOUNTER
Return call made to Buddy. Questions answered. He confirmed he will call  to follow up on recommendations.

## 2022-03-01 ENCOUNTER — DOCUMENTATION ONLY (OUTPATIENT)
Dept: TRANSPLANT | Facility: CLINIC | Age: 47
End: 2022-03-01

## 2022-03-01 NOTE — PROGRESS NOTES
Message sent checking with Buddy regarding the status of his kidney donor evaluation and asking if he is still interested.

## 2022-05-12 ENCOUNTER — TELEPHONE (OUTPATIENT)
Dept: TRANSPLANT | Facility: CLINIC | Age: 47
End: 2022-05-12

## 2022-10-14 NOTE — PROGRESS NOTES
TRANSPLANT NEPHROLOGY DONOR EVALUATION    Assessment and Plan:  # Prospective Kidney Transplant Donor: Patient with a couple of issues that need to be addressed prior to donation. Patient's blood pressure is acceptable at this visit, kidney function appears to be acceptable with Iohexol pending, and urinalysis is bland.    # Need for lung cancer screenin-year-pack history   # APOL gene testing     Discussed the risks of donating a kidney, including the surgical risk and the possible risks of living with one kidney.    Education about expected post-donation kidney function and how chronic kidney disease (CKD) and end stage kidney disease (ESKD) might potentially impact the donor in the future, include, but not limited to:       - On average, donors will have 25-35% permanent loss of kidney function at donation.       - Baseline risk of ESKD may slightly exceed that of members of the general population with the same demographic profile.       - Donor risks must be interpreted in light of known epidemiology of both CKD or ESKD, such as that CKD generally develops in midlife (40-50 years old) and ESKD generally develops after age 60.       - Medical evaluation of young potential donors cannot predict lifetime risk of CKD or ESKD.       - Donors may be at higher risk for CKD if they sustain damage to the remaining kidney.       - Development of CKD and progression of ESKD may be more rapid with only 1 kidney.       - Some type of kidney replacement therapy, either kidney transplant or dialysis, is required when reaching ESKD.    Potential medical or surgical risks include, but not limited to:       - Death.       - Scars, pain, fatigue, and other consequences typical of any surgical procedure.       - Decreased kidney function.       - Abdominal or bowel symptoms, such as bloating and nausea, and developing bowel obstruction.       - Kidney failure (ESKD) and the need for a kidney transplant or dialysis for the  donor.       - Impact of obesity, hypertension, or other donor-specific medical conditions on morbidity and mortality of the potential donor.    Patients overall evaluation will be discussed with the transplant team and a final recommendation on the patients' suitability to be a kidney transplant donor will be made at that time.    Consult:  Buddy Marshall was seen in consultation at the request of Dr. CLARKE for evaluation as a potential kidney transplant donor.    Reason for Visit:  Buddy Marshall is a 46 year old male who presents for a kidney donor evaluation.  Patient would like to donate to Step Father .    Present Condition and Donor-Related Medical History:      Buddy Everett is a 46-year-old gentleman who is interested in donating kidney to his stepfather.  Buddy is generally healthy he does not have known chronic conditions.  He smokes regularly about a pack a day for the last 30 years.  His biological father passed away from lung cancer at age 55.  His usual day is active.  He works as a realtor.  He lives with his wife and 5 children in the household.  His family is supportive of his decision.  He had the opportunity today to ask all of his questions and all of which were answered satisfactorily.         Kidney Disease Hx:       h/o Kidney Problems: No  Family h/o Genetic Kidney Disease: No       h/o HTN: No    Usual BP: does not know       h/o Protein in Urine: No  h/o Blood in Urine: No       h/o Kidney Stones: No  h/o Kidney Injury: No       h/o Recurrent UTI: No  h/o Genitourinary Problems: No       h/o Chronic NSAID Use: No         Other Medical Hx:       h/o DM: No             h/o Gastrointestinal, Pancreas or Liver Problems: No       h/o Lung or Heart Problems: No       h/o Hematologic Problems: No  h/o Bleeding or Clotting Problems: No       h/o Cancer: No       h/o Infection Problems: No              Skin Cancer Risk:       h/o more than 50 moles: No       h/o extensive sun exposure: No        h/o melanoma: No       Family h/o melanoma: porbably not          Mental Health Assessment:       h/o Depression: No       h/o Psychiatric Illness: No       h/o Suicidal Attempt(s): No    COVID Status:  Vaccination Up To Date: Yes  H/o COVID Infection: No     Review Of Systems:   A comprehensive review of systems was obtained and negative, except as noted in the HPI or PMH.    Past Medical History:   History was taken from the patient as noted below.  Past Medical History:   Diagnosis Date     Back pain     resolved after surgery in 2010     NO ACTIVE PROBLEMS      TOBACCO ABUSE-CONTINUOUS        Past Social History:   Past Surgical History:   Procedure Laterality Date     ADENOIDECTOMY       DISKECTOMY, LUMBAR, ADDTNL SP  2010     KNEE SURGERY  2006     PE TUBES  as a child     VASECTOMY  2021     Personal or family history of anesthesia problems: No    Family History:   Family History   Problem Relation Age of Onset     Gynecology Mother      Cancer Mother      Alcohol/Drug Father      Lung Cancer Father      Hypertension Maternal Grandmother      Cardiovascular Maternal Grandmother         at age 56     Circulatory Maternal Grandmother         spleen removed     Heart Disease Maternal Grandmother         angioplasty     Parkinsonism Maternal Grandmother      Nephrolithiasis Maternal Grandfather      Alzheimer Disease Paternal Grandmother      Heart Disease Paternal Grandmother      Emphysema Paternal Grandfather           Specific Family History in First Degree Relatives:       FH of Kidney Dz: No FH of DM: No       FH of HTN: No  FH of CAD: No       FH of Cancer: Yes   FH of Kidney Cancer: No    Personal History:   Social History     Socioeconomic History     Marital status:      Spouse name: Not on file     Number of children: Not on file     Years of education: Not on file     Highest education level: Not on file   Occupational History     Not on file   Tobacco Use     Smoking status: Current Every  Day Smoker     Packs/day: 1.00     Years: 30.00     Pack years: 30.00     Types: Cigarettes     Smokeless tobacco: Never Used     Tobacco comment: Smoking for 14 yrs   Substance and Sexual Activity     Alcohol use: No     Drug use: Yes     Types: Marijuana     Comment: Reffer occationly (twice a month)     Sexual activity: Yes     Partners: Female     Comment: No birth control used   Other Topics Concern      Service No     Blood Transfusions No     Caffeine Concern No     Occupational Exposure No     Hobby Hazards Yes     Comment: playing flag football (reason for the surgery)     Sleep Concern No     Stress Concern No     Weight Concern No     Special Diet No     Back Care No     Exercise Yes     Comment: 3-4 times a week     Bike Helmet No     Seat Belt Yes     Self-Exams No   Social History Narrative     Not on file     Social Determinants of Health     Financial Resource Strain:      Difficulty of Paying Living Expenses:    Food Insecurity:      Worried About Running Out of Food in the Last Year:      Ran Out of Food in the Last Year:    Transportation Needs:      Lack of Transportation (Medical):      Lack of Transportation (Non-Medical):    Physical Activity:      Days of Exercise per Week:      Minutes of Exercise per Session:    Stress:      Feeling of Stress :    Social Connections:      Frequency of Communication with Friends and Family:      Frequency of Social Gatherings with Friends and Family:      Attends Jewish Services:      Active Member of Clubs or Organizations:      Attends Club or Organization Meetings:      Marital Status:    Intimate Partner Violence:      Fear of Current or Ex-Partner:      Emotionally Abused:      Physically Abused:      Sexually Abused:           Specific Social History:       Health Insurance Status: Yes       Employment Status: Full time  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 (Patient not taking: Reported on 10/28/2021)     No current facility-administered medications for this visit.     There are no discontinued medications.      Vitals:  Vital Signs 10/28/2021 10/28/2021 10/28/2021   Systolic 125 131 128   Diastolic 82 83 83   Pulse - - -   Temperature - - -   Weight (LB) - - -   Height - - -   BMI (Calculated) - - -   O2 - - -       Exam:   GENERAL APPEARANCE: alert and no distress  HENT: mouth without ulcers or lesions  LYMPHATICS: no cervical or supraclavicular nodes  RESP: lungs clear to auscultation - no rales, rhonchi or wheezes  CV: regular rhythm, normal rate, no rub, no murmur  EDEMA: no LE edema bilaterally  ABDOMEN: soft, nondistended, nontender, bowel sounds normal  MS: extremities normal - no gross deformities noted, no evidence of inflammation in joints, no muscle tenderness  SKIN: no rash    Results:   Labs and imaging were ordered for this visit and reviewed by me.  Recent Results (from the past 336 hour(s))   Comprehensive metabolic panel    Collection Time: 10/25/21  7:40 AM   Result Value Ref Range    Sodium 136 133 - 144 mmol/L    Potassium 3.6 3.4 - 5.3 mmol/L    Chloride 105 94 - 109 mmol/L    Carbon Dioxide (CO2) 28 20 - 32 mmol/L    Anion Gap 3 3 - 14 mmol/L    Urea Nitrogen 10 7 - 30 mg/dL    Creatinine 0.95 0.66 - 1.25 mg/dL    Calcium 8.9 8.5 - 10.1 mg/dL    Glucose 93 70 - 99 mg/dL    Alkaline Phosphatase 57 40 - 150 U/L    AST 18 0 - 45 U/L    ALT 32 0 - 70 U/L    Protein Total 7.0 6.8 - 8.8 g/dL    Albumin 3.7 3.4 - 5.0 g/dL    Bilirubin Total 1.0 0.2 - 1.3 mg/dL    GFR Estimate >90 >60 mL/min/1.73m2   Lipid Profile     Collection Time: 10/25/21  7:40 AM   Result Value Ref Range    Cholesterol 224 (H) <200 mg/dL    Triglycerides 109 <150 mg/dL    Direct Measure HDL 50 >=40 mg/dL    LDL Cholesterol Calculated 152 (H) <=100 mg/dL    Non HDL Cholesterol 174 (H) <130 mg/dL    Patient Fasting > 8hrs? Yes    Uric acid    Collection Time: 10/25/21  7:40 AM   Result Value Ref Range    Uric Acid 4.7 3.5 - 7.2 mg/dL   Phosphorus    Collection Time: 10/25/21  7:40 AM   Result Value Ref Range    Phosphorus 2.8 2.5 - 4.5 mg/dL   Hemoglobin A1c    Collection Time: 10/25/21  7:40 AM   Result Value Ref Range    Hemoglobin A1C 5.0 0.0 - 5.6 %   INR    Collection Time: 10/25/21  7:40 AM   Result Value Ref Range    INR 1.06 0.85 - 1.15   Partial thromboplastin time    Collection Time: 10/25/21  7:40 AM   Result Value Ref Range    aPTT 30 22 - 38 Seconds   CBC with platelets    Collection Time: 10/25/21  7:40 AM   Result Value Ref Range    WBC Count 8.4 4.0 - 11.0 10e3/uL    RBC Count 5.13 4.40 - 5.90 10e6/uL    Hemoglobin 15.7 13.3 - 17.7 g/dL    Hematocrit 45.9 40.0 - 53.0 %    MCV 90 78 - 100 fL    MCH 30.6 26.5 - 33.0 pg    MCHC 34.2 31.5 - 36.5 g/dL    RDW 12.4 10.0 - 15.0 %    Platelet Count 269 150 - 450 10e3/uL   Hepatitis B core antibody    Collection Time: 10/25/21  7:40 AM   Result Value Ref Range    Hepatitis B Core Antibody Total Nonreactive Nonreactive   Hepatitis B Surface Antibody    Collection Time: 10/25/21  7:40 AM   Result Value Ref Range    Hepatitis B Surface Antibody 0.56 <8.00 m[IU]/mL   Hepatitis B surface antigen    Collection Time: 10/25/21  7:40 AM   Result Value Ref Range    Hepatitis B Surface Antigen Nonreactive Nonreactive   Hepatitis C antibody    Collection Time: 10/25/21  7:40 AM   Result Value Ref Range    Hepatitis C Antibody Nonreactive Nonreactive   HIV Antigen Antibody Combo Pretransplant    Collection Time: 10/25/21  7:40 AM   Result Value Ref Range    HIV Antigen Antibody Combo Pretransplant Nonreactive  Nonreactive   Treponema Abs w Reflex to RPR and Titer    Collection Time: 10/25/21  7:40 AM   Result Value Ref Range    Treponema Antibody Total Nonreactive Nonreactive   West Nile Virus Antibody IgG IgM    Collection Time: 10/25/21  7:40 AM   Result Value Ref Range    West Nile IgG Serum 0.08 <=1.29 IV    West Nile IgM Serum 0.01 <=0.89 IV   CMV Antibody IgG    Collection Time: 10/25/21  7:40 AM   Result Value Ref Range    CMV Sofy IgG Instrument Value >10.00 (H) <0.60 U/mL    CMV Antibody IgG Positive, suggests recent or past exposure. (A) No detectable antibody.    EBV Capsid Antibody IgG    Collection Time: 10/25/21  7:40 AM   Result Value Ref Range    EBV Capsid Sofy IgG Instrument Value >750.0 (H) <18.0 U/mL    EBV Capsid Antibody IgG Positive (A) No detectable antibody.   EBV Capsid Antibody IgM    Collection Time: 10/25/21  7:40 AM   Result Value Ref Range    EBV Capsid Sofy IgM Instrument Value <10.0 <36.0 U/mL    EBV Capsid Antibody IgM No detectable antibody. No detectable antibody.   Asymptomatic COVID-19 Virus (Coronavirus) by PCR Nose    Collection Time: 10/25/21  7:40 AM    Specimen: Nose; Swab   Result Value Ref Range    SARS CoV2 PCR Negative Negative   Adult Type and Screen    Collection Time: 10/25/21  7:40 AM   Result Value Ref Range    ABO/RH(D) O POS     Antibody Screen Negative    Quantiferon TB Gold Plus Grey Tube    Collection Time: 10/25/21  7:40 AM    Specimen: Peripheral Blood   Result Value Ref Range    Quantiferon Nil Tube 0.04 IU/mL   Quantiferon TB Gold Plus Green Tube    Collection Time: 10/25/21  7:40 AM    Specimen: Peripheral Blood   Result Value Ref Range    Quantiferon TB1 Tube 0.07 IU/mL   Quantiferon TB Gold Plus Yellow Tube    Collection Time: 10/25/21  7:40 AM    Specimen: Peripheral Blood   Result Value Ref Range    Quantiferon TB2 Tube 0.11    Quantiferon TB Gold Plus Purple Tube    Collection Time: 10/25/21  7:40 AM    Specimen: Peripheral Blood   Result Value Ref Range     Quantiferon Mitogen 10.00 IU/mL   Quantiferon TB Gold Plus    Collection Time: 10/25/21  7:40 AM    Specimen: Peripheral Blood   Result Value Ref Range    Quantiferon-TB Gold Plus Negative Negative    TB1 Ag minus Nil Value 0.03 IU/mL    TB2 Ag minus Nil Value 0.07 IU/mL    Mitogen minus Nil Result 9.96 IU/mL    Nil Result 0.04 IU/mL   ABO and Rh 2nd type and screen required    Collection Time: 10/25/21  7:41 AM   Result Value Ref Range    ABO/RH(D) O POS     SPECIMEN EXPIRATION DATE     Routine UA with microscopic    Collection Time: 10/25/21  8:16 AM   Result Value Ref Range    Color Urine Yellow Colorless, Straw, Light Yellow, Yellow    Appearance Urine Clear Clear    Glucose Urine Negative Negative mg/dL    Bilirubin Urine Negative Negative    Ketones Urine Negative Negative mg/dL    Specific Gravity Urine 1.013 1.003 - 1.035    Blood Urine Negative Negative    pH Urine 5.0 5.0 - 7.0    Protein Albumin Urine Negative Negative mg/dL    Urobilinogen Urine Normal Normal, 2.0 mg/dL    Nitrite Urine Negative Negative    Leukocyte Esterase Urine Negative Negative    Mucus Urine Present (A) None Seen /LPF    RBC Urine 1 <=2 /HPF    WBC Urine 1 <=5 /HPF   Albumin Random Urine Quantitative with Creat Ratio    Collection Time: 10/25/21  8:17 AM   Result Value Ref Range    Creatinine Urine mg/dL 165 mg/dL    Albumin Urine mg/L 6 mg/L    Albumin Urine mg/g Cr 3.64 0.00 - 17.00 mg/g Cr   Protein  random urine with Creat Ratio    Collection Time: 10/25/21  8:17 AM   Result Value Ref Range    Total Protein Random Urine g/L 0.08 g/L    Total Protein Urine g/gr Creatinine 0.05 0.00 - 0.20 g/g Cr    Creatinine Urine mg/dL 165 mg/dL            complains of pain/discomfort

## 2022-10-16 ENCOUNTER — HEALTH MAINTENANCE LETTER (OUTPATIENT)
Age: 47
End: 2022-10-16

## 2023-11-04 ENCOUNTER — HEALTH MAINTENANCE LETTER (OUTPATIENT)
Age: 48
End: 2023-11-04